# Patient Record
Sex: MALE | Race: WHITE | URBAN - NONMETROPOLITAN AREA
[De-identification: names, ages, dates, MRNs, and addresses within clinical notes are randomized per-mention and may not be internally consistent; named-entity substitution may affect disease eponyms.]

---

## 2017-10-25 ENCOUNTER — TRANSFERRED RECORDS (OUTPATIENT)
Dept: HEALTH INFORMATION MANAGEMENT | Facility: HOSPITAL | Age: 52
End: 2017-10-25

## 2017-10-26 ENCOUNTER — TRANSFERRED RECORDS (OUTPATIENT)
Dept: BEHAVIORAL HEALTH | Facility: HOSPITAL | Age: 52
End: 2017-10-26

## 2017-10-26 ENCOUNTER — HOSPITAL ENCOUNTER (INPATIENT)
Facility: HOSPITAL | Age: 52
LOS: 2 days | Discharge: HOME | DRG: 885 | End: 2017-10-28
Attending: PSYCHIATRY & NEUROLOGY | Admitting: PSYCHIATRY & NEUROLOGY
Payer: COMMERCIAL

## 2017-10-26 DIAGNOSIS — F33.1 MODERATE EPISODE OF RECURRENT MAJOR DEPRESSIVE DISORDER (H): Primary | ICD-10-CM

## 2017-10-26 PROBLEM — F32.A DEPRESSION WITH SUICIDAL IDEATION: Status: ACTIVE | Noted: 2017-10-26

## 2017-10-26 PROBLEM — R45.851 DEPRESSION WITH SUICIDAL IDEATION: Status: ACTIVE | Noted: 2017-10-26

## 2017-10-26 LAB — GLUCOSE BLDC GLUCOMTR-MCNC: 102 MG/DL (ref 70–99)

## 2017-10-26 PROCEDURE — 12400000 ZZH R&B MH

## 2017-10-26 PROCEDURE — 99223 1ST HOSP IP/OBS HIGH 75: CPT | Performed by: NURSE PRACTITIONER

## 2017-10-26 PROCEDURE — 00000146 ZZHCL STATISTIC GLUCOSE BY METER IP

## 2017-10-26 PROCEDURE — 25000132 ZZH RX MED GY IP 250 OP 250 PS 637: Performed by: NURSE PRACTITIONER

## 2017-10-26 RX ORDER — TRAZODONE HYDROCHLORIDE 50 MG/1
50 TABLET, FILM COATED ORAL
Status: DISCONTINUED | OUTPATIENT
Start: 2017-10-26 | End: 2017-10-28 | Stop reason: HOSPADM

## 2017-10-26 RX ORDER — VENLAFAXINE HYDROCHLORIDE 150 MG/1
150 CAPSULE, EXTENDED RELEASE ORAL 2 TIMES DAILY
Status: ON HOLD | COMMUNITY
End: 2017-10-28

## 2017-10-26 RX ORDER — CLOTRIMAZOLE 1 %
1 CREAM (GRAM) TOPICAL 2 TIMES DAILY PRN
Status: DISCONTINUED | OUTPATIENT
Start: 2017-10-26 | End: 2017-10-28 | Stop reason: HOSPADM

## 2017-10-26 RX ORDER — CLONAZEPAM 0.5 MG/1
0.5 TABLET ORAL 2 TIMES DAILY PRN
Status: DISCONTINUED | OUTPATIENT
Start: 2017-10-26 | End: 2017-10-28 | Stop reason: HOSPADM

## 2017-10-26 RX ORDER — OLANZAPINE 10 MG/1
10 TABLET ORAL 3 TIMES DAILY PRN
Status: DISCONTINUED | OUTPATIENT
Start: 2017-10-26 | End: 2017-10-28 | Stop reason: HOSPADM

## 2017-10-26 RX ORDER — TRAZODONE HYDROCHLORIDE 100 MG/1
100 TABLET ORAL AT BEDTIME
Status: DISCONTINUED | OUTPATIENT
Start: 2017-10-26 | End: 2017-10-28 | Stop reason: HOSPADM

## 2017-10-26 RX ORDER — CETIRIZINE HYDROCHLORIDE 10 MG/1
10 TABLET ORAL DAILY
Status: ON HOLD | COMMUNITY
End: 2018-01-20

## 2017-10-26 RX ORDER — SULFAMETHOXAZOLE/TRIMETHOPRIM 800-160 MG
1 TABLET ORAL 2 TIMES DAILY
Status: DISCONTINUED | OUTPATIENT
Start: 2017-10-26 | End: 2017-10-28 | Stop reason: HOSPADM

## 2017-10-26 RX ORDER — ALUMINA, MAGNESIA, AND SIMETHICONE 2400; 2400; 240 MG/30ML; MG/30ML; MG/30ML
30 SUSPENSION ORAL EVERY 4 HOURS PRN
Status: DISCONTINUED | OUTPATIENT
Start: 2017-10-26 | End: 2017-10-28 | Stop reason: HOSPADM

## 2017-10-26 RX ORDER — HYDROCODONE BITARTRATE AND ACETAMINOPHEN 10; 325 MG/1; MG/1
1 TABLET ORAL DAILY PRN
Status: ON HOLD | COMMUNITY
End: 2017-10-28

## 2017-10-26 RX ORDER — ACETAMINOPHEN 325 MG/1
650 TABLET ORAL EVERY 4 HOURS PRN
Status: DISCONTINUED | OUTPATIENT
Start: 2017-10-26 | End: 2017-10-28 | Stop reason: HOSPADM

## 2017-10-26 RX ORDER — CLOTRIMAZOLE 1 %
30 CREAM (GRAM) TOPICAL 2 TIMES DAILY PRN
Status: ON HOLD | COMMUNITY
End: 2018-01-20

## 2017-10-26 RX ORDER — NICOTINE POLACRILEX 4 MG
15-30 LOZENGE BUCCAL
Status: DISCONTINUED | OUTPATIENT
Start: 2017-10-26 | End: 2017-10-28 | Stop reason: HOSPADM

## 2017-10-26 RX ORDER — DULOXETIN HYDROCHLORIDE 60 MG/1
60 CAPSULE, DELAYED RELEASE ORAL DAILY
Status: DISCONTINUED | OUTPATIENT
Start: 2017-10-26 | End: 2017-10-28 | Stop reason: HOSPADM

## 2017-10-26 RX ORDER — CETIRIZINE HYDROCHLORIDE 10 MG/1
10 TABLET ORAL DAILY
Status: DISCONTINUED | OUTPATIENT
Start: 2017-10-26 | End: 2017-10-28 | Stop reason: HOSPADM

## 2017-10-26 RX ORDER — DEXTROSE MONOHYDRATE 25 G/50ML
25-50 INJECTION, SOLUTION INTRAVENOUS
Status: DISCONTINUED | OUTPATIENT
Start: 2017-10-26 | End: 2017-10-28 | Stop reason: HOSPADM

## 2017-10-26 RX ORDER — PROPRANOLOL HYDROCHLORIDE 10 MG/1
10 TABLET ORAL 3 TIMES DAILY
Status: DISCONTINUED | OUTPATIENT
Start: 2017-10-26 | End: 2017-10-28 | Stop reason: HOSPADM

## 2017-10-26 RX ORDER — HYDROXYZINE HYDROCHLORIDE 25 MG/1
25-50 TABLET, FILM COATED ORAL EVERY 4 HOURS PRN
Status: DISCONTINUED | OUTPATIENT
Start: 2017-10-26 | End: 2017-10-26

## 2017-10-26 RX ORDER — HYDROXYZINE HYDROCHLORIDE 25 MG/1
100 TABLET, FILM COATED ORAL 3 TIMES DAILY PRN
Status: DISCONTINUED | OUTPATIENT
Start: 2017-10-26 | End: 2017-10-28 | Stop reason: HOSPADM

## 2017-10-26 RX ORDER — BENZTROPINE MESYLATE 2 MG/1
2 TABLET ORAL AT BEDTIME
Status: DISCONTINUED | OUTPATIENT
Start: 2017-10-26 | End: 2017-10-28 | Stop reason: HOSPADM

## 2017-10-26 RX ORDER — EPINEPHRINE 0.3 MG/.3ML
0.3 INJECTION SUBCUTANEOUS PRN
Status: ON HOLD | COMMUNITY
End: 2018-01-20

## 2017-10-26 RX ORDER — DIVALPROEX SODIUM 250 MG/1
250 TABLET, DELAYED RELEASE ORAL 2 TIMES DAILY
Status: ON HOLD | COMMUNITY
End: 2018-01-20

## 2017-10-26 RX ORDER — SIMVASTATIN 10 MG
20 TABLET ORAL AT BEDTIME
Status: DISCONTINUED | OUTPATIENT
Start: 2017-10-26 | End: 2017-10-28 | Stop reason: HOSPADM

## 2017-10-26 RX ORDER — NITROGLYCERIN 0.4 MG/1
0.4 TABLET SUBLINGUAL EVERY 5 MIN PRN
Status: DISCONTINUED | OUTPATIENT
Start: 2017-10-26 | End: 2017-10-28 | Stop reason: HOSPADM

## 2017-10-26 RX ORDER — OLANZAPINE 10 MG/2ML
10 INJECTION, POWDER, FOR SOLUTION INTRAMUSCULAR 3 TIMES DAILY PRN
Status: DISCONTINUED | OUTPATIENT
Start: 2017-10-26 | End: 2017-10-28 | Stop reason: HOSPADM

## 2017-10-26 RX ORDER — ALBUTEROL SULFATE 90 UG/1
2 AEROSOL, METERED RESPIRATORY (INHALATION) 4 TIMES DAILY PRN
Status: DISCONTINUED | OUTPATIENT
Start: 2017-10-26 | End: 2017-10-28 | Stop reason: HOSPADM

## 2017-10-26 RX ORDER — DIVALPROEX SODIUM 250 MG/1
250 TABLET, DELAYED RELEASE ORAL 2 TIMES DAILY
Status: DISCONTINUED | OUTPATIENT
Start: 2017-10-26 | End: 2017-10-28 | Stop reason: HOSPADM

## 2017-10-26 RX ADMIN — PROPRANOLOL HYDROCHLORIDE 10 MG: 10 TABLET ORAL at 20:18

## 2017-10-26 RX ADMIN — BENZTROPINE MESYLATE 2 MG: 2 TABLET ORAL at 20:19

## 2017-10-26 RX ADMIN — SULFAMETHOXAZOLE AND TRIMETHOPRIM 1 TABLET: 800; 160 TABLET ORAL at 15:40

## 2017-10-26 RX ADMIN — SIMVASTATIN 20 MG: 10 TABLET, FILM COATED ORAL at 20:19

## 2017-10-26 RX ADMIN — SULFAMETHOXAZOLE AND TRIMETHOPRIM 1 TABLET: 800; 160 TABLET ORAL at 20:19

## 2017-10-26 RX ADMIN — CETIRIZINE HYDROCHLORIDE 10 MG: 10 TABLET, FILM COATED ORAL at 15:40

## 2017-10-26 RX ADMIN — OMEPRAZOLE 20 MG: 20 CAPSULE, DELAYED RELEASE ORAL at 15:40

## 2017-10-26 RX ADMIN — LEVOTHYROXINE SODIUM 137 MCG: 112 TABLET ORAL at 15:39

## 2017-10-26 RX ADMIN — TRAZODONE HYDROCHLORIDE 100 MG: 100 TABLET ORAL at 20:19

## 2017-10-26 RX ADMIN — HYDROXYZINE HYDROCHLORIDE 50 MG: 25 TABLET ORAL at 12:16

## 2017-10-26 RX ADMIN — DIVALPROEX SODIUM 250 MG: 250 TABLET, DELAYED RELEASE ORAL at 20:19

## 2017-10-26 RX ADMIN — DIVALPROEX SODIUM 250 MG: 250 TABLET, DELAYED RELEASE ORAL at 15:40

## 2017-10-26 RX ADMIN — DULOXETINE HYDROCHLORIDE 60 MG: 60 CAPSULE, DELAYED RELEASE ORAL at 15:39

## 2017-10-26 RX ADMIN — PROPRANOLOL HYDROCHLORIDE 10 MG: 10 TABLET ORAL at 15:39

## 2017-10-26 ASSESSMENT — ACTIVITIES OF DAILY LIVING (ADL)
TRANSFERRING: 0-->INDEPENDENT
TOILETING: 0-->INDEPENDENT
BATHING: 0-->INDEPENDENT
DRESS: 0-->INDEPENDENT
RETIRED_EATING: 0-->INDEPENDENT
GROOMING: INDEPENDENT
SWALLOWING: 0-->SWALLOWS FOODS/LIQUIDS WITHOUT DIFFICULTY
AMBULATION: 0-->INDEPENDENT
DRESS: SCRUBS (BEHAVIORAL HEALTH);INDEPENDENT
ORAL_HYGIENE: INDEPENDENT
COGNITION: 0 - NO COGNITION ISSUES REPORTED
RETIRED_COMMUNICATION: 0-->UNDERSTANDS/COMMUNICATES WITHOUT DIFFICULTY
FALL_HISTORY_WITHIN_LAST_SIX_MONTHS: NO

## 2017-10-26 NOTE — IP AVS SNAPSHOT
[facility]    [address] [place] [address]    Phone:  [phone]    Fax:  [phone]                                       After Visit Summary   10/26/2017    Desean Orlando    MRN: [id number]           After Visit Summary Signature Page     I have received my discharge instructions, and my questions have been answered. I have discussed any challenges I see with this plan with the nurse or doctor.    ..........................................................................................................................................  Patient/Patient Representative Signature      ..........................................................................................................................................  Patient Representative Print Name and Relationship to Patient    ..................................................               ................................................  Date                                            Time    ..........................................................................................................................................  Reviewed by Signature/Title    ...................................................              ..............................................  Date                                                            Time

## 2017-10-26 NOTE — IP AVS SNAPSHOT
MRN:[id number]                      After Visit Summary   10/26/2017    Desean Orlando    MRN: [id number]           Thank you!     Thank you for choosing [facility] for your care. Our goal is always to provide you with excellent care. Hearing back from our patients is one way we can continue to improve our services. Please take a few minutes to complete the written survey that you may receive in the mail after you visit with us. Thank you!        Patient Information     Date Of Birth          1965        Designated Caregiver       Most Recent Value    Caregiver    Will someone help with your care after discharge? no      About your hospital stay     You were admitted on:  October 26, 2017 You last received care in the:  [facility]    You were discharged on:  October 28, 2017       Who to Call     For medical emergencies, please call 911.  For non-urgent questions about your medical care, please call your primary care provider or clinic, [phone]          Attending Provider     Provider Specialty    Vlad Ruiz MD Psychiatry    Tatyana Husain NP Nurse Practitioner       Primary Care Provider Office Phone # Fax #    [place] Radha [phone]      Further instructions from your care team       Behavioral Discharge Planning and Instructions    Summary: Desean was admitted to  for increased depression.     Main Diagnosis:     Major Treatments, Procedures and Findings: Stabilize with medications, connect with community programs.    Symptoms to Report: feeling more aggressive, increased confusion, losing more sleep, mood getting worse or thoughts of suicide    Lifestyle Adjustment: Take all medications as prescribed, meet with doctor/ medication provider, out patient therapist, , and [facility] worker as scheduled. Abstain from alcohol or any unprescribed drugs.    Psychiatry Follow-up:     [facility]   PCP/Med. Management- Dr. Garcia 11/2  @10:20  [address] [place] [address]  Phone: [phone]  Fax: [phone]    [facility]   Therapy- Flash Haley 11/7 @[facility] [place] [address]  Phone:[phone]  Fax: [phone]      Resources:   Crisis Intervention: 229.619.4946 or 069-272-2142 (TTY: 692.895.6922).  Call anytime for help.  National [place] on Mental Illness (www.[place].ko.org): 990.803.7996 or 243-019-6194.  Alcoholics Anonymous (www.alcoholics-anonymous.org): Check your phone book for your local chapter.  Suicide Awareness Voices of Education (SAVE) (www.save.org): 506-177-IKPJ (5526)  National Suicide Prevention Line (www.mentalhealthmn.org): 901-482-WFTO (0951)  Mental Health Consumer/Survivor Network of [place] (www.mhcsn.net): 239.244.3718 or 238-384-4696  Mental Health Association of [place] (www.mentalhealth.org): 780.861.1270 or 492-859-6053    General Medication Instructions:   See your medication sheet(s) for instructions.   Take all medicines as directed.  Make no changes unless your doctor suggests them.   Go to all your doctor visits.  Be sure to have all your required lab tests. This way, your medicines can be refilled on time.  Do not use any drugs not prescribed by your doctor.  Avoid alcohol.    [place] Area:  [facility], Crisis stabilization [facility]- [phone]  [facility] Crisis Line: 6-912-408-7420  [facility] Family [facility]: 313-8963  Sexual Assault Program [facility]: 502.797.8926 or 1-518.545.6020  [place] [facility]: 8-061-832-6324 Ext: 279       Calls answered Mon-Fri-8:00 am--4:30 pm    [place]:  [facility]: 9-766-970-3703  [facility] first call for help: 7-137-157-5851  [facility]:  ([phone] 441-5606      [place] Area:  Warm Line: 1-416.340.8096       Calls answered Tuesday--Saturday 4:00 pm--10:00 pm  [facility] Crisis [facility] - [phone]  [facility] 856-839-6758    [place] Statewide:  [place] Crisis and  "Referral Services: 1-664-779-7715  National Suicide Prevention Lifeline: 0-626-256-TALK (8280)   - pjo6fmgs- Text  Life  to [address]  First Call for Help:   [facility] Helpline- 3-667-VZZM-HELP    Pending Results     Date and Time Order Name Status Description    10/28/2017 0001 Folate In process     10/28/2017 0001 Vitamin B12 In process     10/28/2017 0001 Vitamin D In process             Statement of Approval     Ordered          10/28/17 1214  I have reviewed and agree with all the recommendations and orders detailed in this document.  EFFECTIVE NOW     Approved and electronically signed by:  Tatyana Husain NP             Admission Information     Date & Time Provider Department Dept. Phone    10/26/2017 Tatyana Husain NP [facility] [phone]      Your Vitals Were     Blood Pressure Pulse Temperature Respirations Height Weight    118/74 65 97.4  F (36.3  C) (Tympanic) 14 1.88 m (6' 2\") 108.4 kg (239 lb)    Pulse Oximetry BMI (Body Mass Index)                98% 30.69 kg/m2          [facility] Information     [employer]olloze lets you send messages to your doctor, view your test results, renew your prescriptions, schedule appointments and more. To sign up, go to [email or web address] . Click on \"Log in\" on the left side of the screen, which will take you to the Welcome page. Then click on \"Sign up Now\" on the right side of the page.     You will be asked to enter the access code listed below, as well as some personal information. Please follow the directions to create your username and password.     Your access code is: [email or web address]  Expires: 2018 12:32 PM     Your access code will  in 90 days. If you need help or a new code, please call your [place] clinic or [phone].        Care EveryWhere ID     This is your Care EveryWhere ID. This could be used by other organizations to access your [place] medical records  [id number]        Equal Access to Services     YOLANDA RIVERS AH: Hadii salvatore collins " cordell Gonzales, waaxda luqadaha, qaybta kaalmada adejonathon, clementine remiin hayaan gitamerlyn jeremypaul laChristiankarissa dontrell. So [facility] [phone].    ATENCIÓN: Si habla afua, tiene a rosario disposición servicios gratuitos de asistencia lingüística. Tim al [phone].    We comply with applicable federal civil rights laws and [place] laws. We do not discriminate on the basis of race, color, national origin, age, disability, sex, sexual orientation, or gender identity.               Review of your medicines      START taking        Dose / Directions    sulfamethoxazole-trimethoprim 800-160 MG per tablet   Commonly known as:  BACTRIM DS/SEPTRA DS   Indication:  Urinary Tract Infection   Used for:  Moderate episode of recurrent major depressive disorder (H)        Dose:  1 tablet   Take 1 tablet by mouth 2 times daily Through 10/30/17   Quantity:  5 tablet   Refills:  0         CONTINUE these medicines which have NOT CHANGED        Dose / Directions    ACETAMINOPHEN PO        Dose:  500 mg   Take 500 mg by mouth 4 times daily as needed for pain   Refills:  0       ASPIRIN PO        Dose:  81 mg   Take 81 mg by mouth daily   Refills:  0       BENZTROPINE MESYLATE PO        Dose:  2 mg   Take 2 mg by mouth At Bedtime   Refills:  0       cetirizine 10 MG tablet   Commonly known as:  zyrTEC        Dose:  10 mg   Take 10 mg by mouth daily   Refills:  0       CLONAZEPAM PO        Dose:  0.5 mg   Take 0.5 mg by mouth 2 times daily as needed for anxiety   Refills:  0       clotrimazole 1 % cream   Commonly known as:  LOTRIMIN        Dose:  30 g   Apply 30 g topically 2 times daily as needed   Refills:  0       divalproex 250 MG EC tablet   Commonly known as:  DEPAKOTE        Dose:  250 mg   Take 250 mg by mouth 2 times daily   Refills:  0       DULOXETINE HCL PO        Dose:  60 mg   Take 60 mg by mouth daily   Refills:  0       EPINEPHrine 0.3 MG/0.3ML injection 2-pack   Commonly known as:  EPIPEN/ADRENACLICK/or ANY BX GENERIC EQUIV         Dose:  0.3 mg   Inject 0.3 mg into the muscle as needed for anaphylaxis   Refills:  0       glucose 4 G Chew chewable tablet        Dose:  1 tablet   Take 1 tablet by mouth 4 times daily as needed for low blood sugar   Refills:  0       HYDROXYZINE HCL PO        Dose:  100 mg   Take 100 mg by mouth 3 times daily as needed for itching (anxiety)   Refills:  0       LEVEMIR SC        Dose:  40 Units   Inject 40 Units Subcutaneous 2 times daily   Refills:  0       LEVOTHYROXINE SODIUM PO        Dose:  137 mcg   Take 137 mcg by mouth daily   Refills:  0       NITROGLYCERIN SL        Dose:  0.4 mg   Place 0.4 mg under the tongue   Refills:  0       OMEPRAZOLE PO        Dose:  20 mg   Take 20 mg by mouth daily   Refills:  0       PROPRANOLOL HCL PO        Dose:  10 mg   Take 10 mg by mouth 3 times daily   Refills:  0       SIMVASTATIN PO        Dose:  20 mg   Take 20 mg by mouth At Bedtime   Refills:  0       TRAZODONE HCL PO        Dose:  100 mg   Take 100 mg by mouth At Bedtime   Refills:  0       VENTOLIN HFA IN        Dose:  2 puff   Inhale 2 puffs into the lungs 4 times daily as needed   Refills:  0         STOP taking     FLUOXETINE HCL PO           HYDROcodone-acetaminophen  MG per tablet   Commonly known as:  NORCO           venlafaxine 150 MG 24 hr capsule   Commonly known as:  EFFEXOR-XR                Where to get your medicines      These medications were sent to [facility] [address] [place] [address] - [address] [facility] [address] [facility] [address] [facility] [address]    Hours:  24-hours Phone:  [phone]     sulfamethoxazole-trimethoprim 800-160 MG per tablet               ANTIBIOTIC INSTRUCTION     You've Been Prescribed an Antibiotic - Now What?  Your healthcare team thinks that you or your loved one might have an infection. Some infections can be treated with antibiotics, which are powerful, life-saving drugs. Like all medications, antibiotics have side effects and should only  be used when necessary. There are some important things you should know about your antibiotic treatment.      Your healthcare team may run tests before you start taking an antibiotic.    Your team may take samples (e.g., from your blood, urine or other areas) to run tests to look for bacteria. These test can be important to determine if you need an antibiotic at all and, if you do, which antibiotic will work best.      Within a few days, your healthcare team might change or even stop your antibiotic.    Your team may start you on an antibiotic while they are working to find out what is making you sick.    Your team might change your antibiotic because test results show that a different antibiotic would be better to treat your infection.    In some cases, once your team has more information, they learn that you do not need an antibiotic at all. They may find out that you don't have an infection, or that the antibiotic you're taking won't work against your infection. For example, an infection caused by a virus can't be treated with antibiotics. Staying on an antibiotic when you don't need it is more likely to be harmful than helpful.      You may experience side effects from your antibiotic.    Like all medications, antibiotics have side effects. Some of these can be serious.    Let you healthcare team know if you have any known allergies when you are admitted to the hospital.    One significant side effect of nearly all antibiotics is the risk of severe and sometimes deadly diarrhea caused by Clostridium difficile (C. Difficile). This occurs when a person takes antibiotics because some good germs are destroyed. Antibiotic use allows C. diificile to take over, putting patients at high risk for this serious infection.    As a patient or caregiver, it is important to understand your or your loved one's antibiotic treatment. It is especially important for caregivers to speak up when patients can't speak for themselves.  Here are some important questions to ask your healthcare team.    What infection is this antibiotic treating and how do you know I have that infection?    What side effects might occur from this antibiotic?    How long will I need to take this antibiotic?    Is it safe to take this antibiotic with other medications or supplements (e.g., vitamins) that I am taking?     Are there any special directions I need to know about taking this antibiotic? For example, should I take it with food?    How will I be monitored to know whether my infection is responding to the antibiotic?    What tests may help to make sure the right antibiotic is prescribed for me?      Information provided by:  www.cdc.gov/getsmart  U.S. Department of Health and Human Services  Centers for disease Control and Prevention  National Center for Emerging and Zoonotic Infectious Diseases  Division of Healthcare Quality Promotion         Protect others around you: Learn how to safely use, store and throw away your medicines at www.disposemymeds.org.             Medication List: This is a list of all your medications and when to take them. Check marks below indicate your daily home schedule. Keep this list as a reference.      Medications           Morning Afternoon Evening Bedtime As Needed    ACETAMINOPHEN PO   Take 500 mg by mouth 4 times daily as needed for pain                                ASPIRIN PO   Take 81 mg by mouth daily                                BENZTROPINE MESYLATE PO   Take 2 mg by mouth At Bedtime   Last time this was given:  2 mg on 10/27/2017  8:36 PM                                cetirizine 10 MG tablet   Commonly known as:  zyrTEC   Take 10 mg by mouth daily   Last time this was given:  10 mg on 10/28/2017  9:05 AM                                CLONAZEPAM PO   Take 0.5 mg by mouth 2 times daily as needed for anxiety                                clotrimazole 1 % cream   Commonly known as:  LOTRIMIN   Apply 30 g topically 2  times daily as needed                                divalproex 250 MG EC tablet   Commonly known as:  DEPAKOTE   Take 250 mg by mouth 2 times daily   Last time this was given:  250 mg on 10/28/2017  9:06 AM                                DULOXETINE HCL PO   Take 60 mg by mouth daily   Last time this was given:  60 mg on 10/28/2017  9:06 AM                                EPINEPHrine 0.3 MG/0.3ML injection 2-pack   Commonly known as:  EPIPEN/ADRENACLICK/or ANY BX GENERIC EQUIV   Inject 0.3 mg into the muscle as needed for anaphylaxis                                glucose 4 G Chew chewable tablet   Take 1 tablet by mouth 4 times daily as needed for low blood sugar                                HYDROXYZINE HCL PO   Take 100 mg by mouth 3 times daily as needed for itching (anxiety)   Last time this was given:  50 mg on 10/26/2017 12:16 PM                                LEVEMIR SC   Inject 40 Units Subcutaneous 2 times daily                                LEVOTHYROXINE SODIUM PO   Take 137 mcg by mouth daily   Last time this was given:  137 mcg on 10/28/2017  6:41 AM                                NITROGLYCERIN SL   Place 0.4 mg under the tongue                                OMEPRAZOLE PO   Take 20 mg by mouth daily   Last time this was given:  20 mg on 10/28/2017  6:41 AM                                PROPRANOLOL HCL PO   Take 10 mg by mouth 3 times daily   Last time this was given:  10 mg on 10/28/2017  9:06 AM                                SIMVASTATIN PO   Take 20 mg by mouth At Bedtime   Last time this was given:  20 mg on 10/27/2017  8:36 PM                                sulfamethoxazole-trimethoprim 800-160 MG per tablet   Commonly known as:  BACTRIM DS/SEPTRA DS   Take 1 tablet by mouth 2 times daily Through 10/30/17   Last time this was given:  1 tablet on 10/28/2017  9:06 AM                                TRAZODONE HCL PO   Take 100 mg by mouth At Bedtime   Last time this was given:  100 mg on 10/27/2017   8:37 PM                                VENTOLIN HFA IN   Inhale 2 puffs into the lungs 4 times daily as needed
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
## 2017-10-26 NOTE — PROGRESS NOTES
10/26/17 1138   Patient Belongings   Did you bring any home meds/supplements to the hospital?  Yes   Disposition of meds  Sent to security/pharmacy per site process   Patient Belongings clothing;glasses;keys;shoes;wallet   Disposition of Belongings Kept with patient;Other (see comment)  (eye glasses and plaid boxers kept with patient all other belongings sent to cubby in patient belongings room)   Belongings Search Yes   Clothing Search Yes   Second Staff Loki CROOKS   General Info Comment Blue jacket with [employer] emblem, 1 pair grey footies, 1 pair wrangler jeans, 1 green palaid shirt, brown leather belt, camo cap, white socks, palid boxers, blue tennis shoes size 13, 1 cath tube, cigs, lighter, matches, 6 packs lubricating jelly plus 1 partial tube, green pen , 16 cents    List items sent to safe: black wallet, [place], 2 health partner ins. cards, 1 set of keys    All other belongings put in assigned cubby in belongings room.     I have reviewed my belongings list on admission and verify that it is correct.     Patient signature_______________________________    Second staff witness (if patient unable to sign) ______________________________       I have received all my belongings at discharge.    Patient signature________________________________    Юлия   10/26/2017  11:48 AM

## 2017-10-26 NOTE — PLAN OF CARE
Face to face end of shift report received from Liat TSE RN. Rounding completed. Patient observed in his room.     Yue Womack  10/26/2017  3:48 PM

## 2017-10-27 LAB
GLUCOSE BLDC GLUCOMTR-MCNC: 115 MG/DL (ref 70–99)
GLUCOSE BLDC GLUCOMTR-MCNC: 173 MG/DL (ref 70–99)

## 2017-10-27 PROCEDURE — 99232 SBSQ HOSP IP/OBS MODERATE 35: CPT | Performed by: NURSE PRACTITIONER

## 2017-10-27 PROCEDURE — 12400000 ZZH R&B MH

## 2017-10-27 PROCEDURE — 25000131 ZZH RX MED GY IP 250 OP 636 PS 637: Performed by: NURSE PRACTITIONER

## 2017-10-27 PROCEDURE — 00000146 ZZHCL STATISTIC GLUCOSE BY METER IP

## 2017-10-27 PROCEDURE — 25000132 ZZH RX MED GY IP 250 OP 250 PS 637: Performed by: NURSE PRACTITIONER

## 2017-10-27 RX ADMIN — PROPRANOLOL HYDROCHLORIDE 10 MG: 10 TABLET ORAL at 13:42

## 2017-10-27 RX ADMIN — INSULIN DETEMIR 40 UNITS: 100 INJECTION, SOLUTION SUBCUTANEOUS at 08:56

## 2017-10-27 RX ADMIN — SIMVASTATIN 20 MG: 10 TABLET, FILM COATED ORAL at 20:36

## 2017-10-27 RX ADMIN — SULFAMETHOXAZOLE AND TRIMETHOPRIM 1 TABLET: 800; 160 TABLET ORAL at 20:36

## 2017-10-27 RX ADMIN — DIVALPROEX SODIUM 250 MG: 250 TABLET, DELAYED RELEASE ORAL at 08:55

## 2017-10-27 RX ADMIN — PROPRANOLOL HYDROCHLORIDE 10 MG: 10 TABLET ORAL at 20:36

## 2017-10-27 RX ADMIN — SULFAMETHOXAZOLE AND TRIMETHOPRIM 1 TABLET: 800; 160 TABLET ORAL at 08:55

## 2017-10-27 RX ADMIN — DULOXETINE HYDROCHLORIDE 60 MG: 60 CAPSULE, DELAYED RELEASE ORAL at 08:55

## 2017-10-27 RX ADMIN — INSULIN DETEMIR 40 UNITS: 100 INJECTION, SOLUTION SUBCUTANEOUS at 20:49

## 2017-10-27 RX ADMIN — BENZTROPINE MESYLATE 2 MG: 2 TABLET ORAL at 20:36

## 2017-10-27 RX ADMIN — DIVALPROEX SODIUM 250 MG: 250 TABLET, DELAYED RELEASE ORAL at 20:36

## 2017-10-27 RX ADMIN — TRAZODONE HYDROCHLORIDE 100 MG: 100 TABLET ORAL at 20:37

## 2017-10-27 RX ADMIN — LEVOTHYROXINE SODIUM 137 MCG: 112 TABLET ORAL at 07:05

## 2017-10-27 RX ADMIN — PROPRANOLOL HYDROCHLORIDE 10 MG: 10 TABLET ORAL at 08:55

## 2017-10-27 RX ADMIN — CETIRIZINE HYDROCHLORIDE 10 MG: 10 TABLET, FILM COATED ORAL at 08:55

## 2017-10-27 RX ADMIN — OMEPRAZOLE 20 MG: 20 CAPSULE, DELAYED RELEASE ORAL at 07:05

## 2017-10-27 ASSESSMENT — ACTIVITIES OF DAILY LIVING (ADL): GROOMING: INDEPENDENT
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
## 2017-10-27 NOTE — PLAN OF CARE
Problem: Patient Care Overview  Goal: Team Discussion  Team Plan:   BEHAVIORAL TEAM DISCUSSION     Participants: Caroline Valdez NP, Rocio Short NP, Katheryn Xavier [facility], Karin Smith [facility] Center[facility], Yaz BarrettFauquier Health SystemSW,  Fanny Russell Discharge Plannner, Enedelia Loco RN, Stacie Lopez RN, Lacey Bran OT, Nadiya Song OT   Progress: Some. Not suicidal  Continued Stay Criteria/Rationale: Intermittent d/t impulsiveness  Medical/Physical: TBI  Precautions:   Behavioral Orders   Procedures     Code 1 - Restrict to Unit     Routine Programming       As clinically indicated     Status 15       Every 15 minutes.     Plan: OT eval for cognition  Rationale for change in precautions or plan: None
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
## 2017-10-27 NOTE — DISCHARGE INSTRUCTIONS
Behavioral Discharge Planning and Instructions    Summary: Desean was admitted to  for increased depression.     Main Diagnosis:     Major Treatments, Procedures and Findings: Stabilize with medications, connect with community programs.    Symptoms to Report: feeling more aggressive, increased confusion, losing more sleep, mood getting worse or thoughts of suicide    Lifestyle Adjustment: Take all medications as prescribed, meet with doctor/ medication provider, out patient therapist, , and [facility] worker as scheduled. Abstain from alcohol or any unprescribed drugs.    Psychiatry Follow-up:     [facility]   PCP/Med. Management- Dr. Garcia 11/2 @10:20  [address] [place] [address]  Phone: [phone]  Fax: [phone]    [facility]   Therapy- Flash Haley 11/7 @[address] [place] [address]  Phone:[phone]  Fax: [phone]      Resources:   Crisis Intervention: 418.373.4967 or 604-617-4896 (TTY: 896.623.9368).  Call anytime for help.  National [place] on Mental Illness (www.[place].ko.org): 811.476.8738 or 895-297-4975.  Alcoholics Anonymous (www.alcoholics-anonymous.org): Check your phone book for your local chapter.  Suicide Awareness Voices of Education (SAVE) (www.save.org): 357-310-HAFS (1883)  National Suicide Prevention Line (www.mentalhealthmn.org): 623-984-JIXN (3949)  Mental Health Consumer/Survivor Network of [place] (www.mhcsn.net): 726.596.5374 or 668-519-2328  Mental Health Association of [place] (www.mentalhealth.org): 315.493.2818 or 248-324-0584    General Medication Instructions:   See your medication sheet(s) for instructions.   Take all medicines as directed.  Make no changes unless your doctor suggests them.   Go to all your doctor visits.  Be sure to have all your required lab tests. This way, your medicines can be refilled on time.  Do not use any drugs not prescribed by your doctor.  Avoid alcohol.    [place] Area:  [facility]  stabilization housing- [phone]  [facility] Crisis Line: 2-114-994-8044  Advocates For Family Peace: 715-5456  Sexual Assault Program [facility]: 133.380.1090 or 1-571.105.4706  [facility] [place] Battered Women's Program: 8-010-216-8553 Ext: 279       Calls answered Mon-Fri-8:00 am--4:30 pm    [place]:  Advocates for Family Peace: 6-205-072-2372  [facility] first call for help: 7-978-567-8835  [facility]:  (553) 133-8168      [place] Area:  Warm Line: 1-842.977.1426       Calls answered Tuesday--Saturday 4:00 pm--10:00 pm  [facility] Crisis Line - 407.241.1147  [facility] Stabilization 121-875-9175    [place] Statewide:  [place] Crisis and Referral Services: 6-635-224-0785  National Suicide Prevention Lifeline: 0-462-713-TALK (2331)   - muv7nwuu- Text  Life  to 23840  First Call for Help: 2-1-1  MICHELL Helpline- 4-131-VMMD-HELP
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
## 2017-10-27 NOTE — PLAN OF CARE
Face to face end of shift report received from Ruchi RENTERIA. Rounding completed. Patient observed in [facility].    Stacie Lopez  10/27/2017  7:38 AM

## 2017-10-27 NOTE — PLAN OF CARE
Face to face end of shift report received from Stacie CHOU RN. Rounding completed. Patient observed.     Yue Laurent  10/27/2017  4:02 PM
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
## 2017-10-27 NOTE — PLAN OF CARE
Problem: Patient Care Overview  Goal: Individualization & Mutuality  Pt will attend at least 50% of group.  Pt will be compliant with prescribed medication.  Pt will agree with treatment team recommendations.   Pt will sleep 6 - 8 hours a night   Outcome: No Change  Slept without issue - approx  5 to 6 hours - has used the catheter twice in 30 min. Does say it is still uncomfortable to use.  No blood or residue noted on catheter when staff disposes of.

## 2017-10-27 NOTE — PLAN OF CARE
"Face to face end of shift report received from matias Turner. Rounding completed. Patient observed. Lying on right side - eyes closed - non-labored breathing noted. Left a sticky note in CNP area to address - pt apparently has a \"yeast\" infection in his groin area.    Ruchi Murillo  10/27/2017  1:51 AM          "

## 2017-10-27 NOTE — PROGRESS NOTES
[facility]  Psychiatric Progress Note      Impression:   This is a 52 year old male with a history of TBI and depression. Today Desean denies nay suicidal ideation and is in a bright mood. He feels his medications are working well and he is no longer suicidal. Desean tells me that his [employer] worker is helping him get another apartment or place to live. Desean found out he qualifies for section 8 so he is happy about that. Desean is hoping to be out of [facility] by November 1st. He does tell me that he has a poor memory and cognitive issues due to his TBI. OT will do an evaluation to establish a baseline and help initiate supportive services as applicable.    Educated regarding medication indications, risks, benefits, side effects, contraindications and possible interactions. Verbally expressed understanding.        DIagnoses:     MDD-moderate-without psychotic features  TBI    Attestation:  Patient has been seen and evaluated by me,  Rocio Short, DARYL          Interim History:   The patient's care was discussed with the treatment team and chart notes were reviewed.          Medications:     Current Facility-Administered Medications Ordered in Epic   Medication Dose Route Frequency Last Rate Last Dose     acetaminophen (TYLENOL) tablet 650 mg  650 mg Oral Q4H PRN         alum & mag hydroxide-simethicone (MYLANTA ES/MAALOX  ES) suspension 30 mL  30 mL Oral Q4H PRN         magnesium hydroxide (MILK OF MAGNESIA) suspension 30 mL  30 mL Oral At Bedtime PRN         traZODone (DESYREL) tablet 50 mg  50 mg Oral At Bedtime PRN         OLANZapine (zyPREXA) tablet 10 mg  10 mg Oral TID PRN        Or     OLANZapine (zyPREXA) injection 10 mg  10 mg Intramuscular TID PRN         nicotine polacrilex (NICORETTE) gum 2-4 mg  2-4 mg Buccal Q1H PRN         albuterol (PROAIR HFA/PROVENTIL HFA/VENTOLIN HFA) Inhaler 2 puff  2 puff Inhalation 4x Daily PRN         benztropine (COGENTIN) tablet 2 mg  2 mg Oral At Bedtime   2 mg  "at 10/26/17 2019     cetirizine (zyrTEC) tablet 10 mg  10 mg Oral Daily   10 mg at 10/27/17 0855     clotrimazole (LOTRIMIN) 1 % cream 1 g  1 applicator Topical BID PRN         divalproex (DEPAKOTE) EC tablet 250 mg  250 mg Oral BID   250 mg at 10/27/17 0855     hydrOXYzine (ATARAX) tablet 100 mg  100 mg Oral TID PRN         insulin detemir (LEVEMIR) injection 40 Units  40 Units Subcutaneous BID   40 Units at 10/27/17 0856     levothyroxine (SYNTHROID/LEVOTHROID) tablet 137 mcg  137 mcg Oral Daily   137 mcg at 10/27/17 0705     nitroGLYcerin (NITROSTAT) sublingual tablet 0.4 mg  0.4 mg Sublingual Q5 Min PRN         omeprazole (priLOSEC) CR capsule 20 mg  20 mg Oral Daily   20 mg at 10/27/17 0705     propranolol (INDERAL) tablet 10 mg  10 mg Oral TID   10 mg at 10/27/17 0855     simvastatin (ZOCOR) tablet 20 mg  20 mg Oral At Bedtime   20 mg at 10/26/17 2019     traZODone (DESYREL) tablet 100 mg  100 mg Oral At Bedtime   100 mg at 10/26/17 2019     DULoxetine (CYMBALTA) EC capsule 60 mg  60 mg Oral Daily   60 mg at 10/27/17 0855     clonazePAM (klonoPIN) tablet 0.5 mg  0.5 mg Oral BID PRN         sulfamethoxazole-trimethoprim (BACTRIM DS/SEPTRA DS) 800-160 MG per tablet 1 tablet  1 tablet Oral BID   1 tablet at 10/27/17 0855     glucose 40 % gel 15-30 g  15-30 g Oral Q15 Min PRN        Or     dextrose 50 % injection 25-50 mL  25-50 mL Intravenous Q15 Min PRN        Or     glucagon injection 1 mg  1 mg Subcutaneous Q15 Min PRN         No current Epic-ordered outpatient prescriptions on file.              10 point ROS negative        Allergies:     Allergies   Allergen Reactions     Bee Venom Anaphylaxis     Fish-Derived Products      Latex      Naproxen      Shellfish-Derived Products      Penicillins Rash            Psychiatric Examination:   /67  Pulse 65  Temp 96.6  F (35.9  C) (Tympanic)  Resp 14  Ht 1.88 m (6' 2\")  Wt 108.4 kg (239 lb)  SpO2 97%  BMI 30.69 kg/m2  Weight is 239 lbs 0 oz  Body mass " index is 30.69 kg/(m^2).    Appearance:  awake, alert, adequately groomed and dressed in hospital scrubs  Attitude:  cooperative  Eye Contact:  good  Mood:  better  Affect:  appropriate and in normal range and mood congruent  Speech:  clear, coherent  Psychomotor Behavior:  no evidence of tardive dyskinesia, dystonia, or tics  Thought Process:  logical  Associations:  no loose associations  Thought Content:  no evidence of suicidal ideation or homicidal ideation, no evidence of psychotic thought, no auditory hallucinations present and no visual hallucinations present  Insight:  good  Judgment:  fair  Oriented to:  time, person, and place  Attention Span and Concentration:  fair  Recent and Remote Memory:  limited  Fund of Knowledge: delayed  Muscle Strength and Tone: normal  Gait and Station: Normal           Labs:     Results for orders placed or performed during the hospital encounter of 10/26/17 (from the past 48 hour(s))   Glucose by meter   Result Value Ref Range    Glucose 102 (H) 70 - 99 mg/dL   Glucose by meter   Result Value Ref Range    Glucose 173 (H) 70 - 99 mg/dL                Plan:   Continue current medications  OT eval

## 2017-10-27 NOTE — PHARMACY
[facility]    Pharmacy      Antimicrobial Stewardship Note     Current antimicrobial therapy:  Anti-infectives (Future)    Start     Dose/Rate Route Frequency Ordered Stop    10/26/17 1515  sulfamethoxazole-trimethoprim (BACTRIM DS/SEPTRA DS) 800-160 MG per tablet 1 tablet      1 tablet Oral 2 TIMES DAILY 10/26/17 1455 10/31/17 0859          Indication: UTI     Pertinent labs:  Creatinine No results found for: CR  WBC No results found for: WBC  ANC No components found for: ANC     Culture Results: None performed.     Recommendations/Interventions:  1. No recommendations at this time; current antimicrobial pharmacotherapy is appropriate.    Uriel Pozo [facility]  October 27, 2017

## 2017-10-27 NOTE — PLAN OF CARE
Problem: Patient Care Overview  Goal: Plan of Care/Patient Progress Review  OT evaluation completed on this date. Patient denies any problems with his medication management;however, collaborative information from public health nurse notes otherwise. Patient was adverse to completing medication CPT but did complete ACL (Erick Cognitive Level) which provides insight into safety with his medication management.    ACL score was 5.0/5.8 which indicates that the person may live alone with weekly checks to monitor safety and check problem solving effectiveness. [place] in attending regularly scheduled community activities may be expected. 22% standby cognitive assistance is refquired to anticipate environmental hazards and prevent social conflict.   At this level reality orientation can include these characteristics: Knows to make appointments and to schedule infrequent events, but is apt to forget or miss. Conceives of self as entiltled to individual rights, wholly autonomous, or the passive recipient of external actions (i.e. a 'victim'). Frequently frustrated with perceived overwhelming variables of life. To help patient reality test, point out concrete examples of illogical, unacceptable or provocative behavior, being late for appointments and potentially damaging consequences in intended activities. Patient may be defensive and argumentative with therapist's or others' attempts to assist.   The following warnings are ways of preventing common safety problems: Medication: prevent poor compliance with taking medication by handing measured liquids and pills to the person or pouring into daily pill box marked for the day and time. Prevent running out of medications by checking on supply and renewing prescriptions. Finances: provide a weekly or monthly income and assist with long term finances. Meals: If a stove is used check to be sure that it is turned off, move pot holders, paper and anything else that could burn  away from heat, turn pot handles toward stove, and carry boiling water for patient.   Summary:  Patient notes that he currently has a Rep Payee, [employer], and assistance with [facility] which provides transportation which addresses some of the areas of concern identified from the assessment. Patient at a [facility] which provides meals.   At this time the patient could benefit from medication monitoring for compliance which can include but not limited to administration, setup of weekly meds, monitoring administration of meds, assisting with refills, use of a med-ready or similar.

## 2017-10-28 VITALS
BODY MASS INDEX: 30.67 KG/M2 | TEMPERATURE: 97.4 F | RESPIRATION RATE: 14 BRPM | WEIGHT: 239 LBS | DIASTOLIC BLOOD PRESSURE: 74 MMHG | OXYGEN SATURATION: 98 % | SYSTOLIC BLOOD PRESSURE: 118 MMHG | HEART RATE: 65 BPM | HEIGHT: 74 IN

## 2017-10-28 LAB
ALBUMIN SERPL-MCNC: 2.9 G/DL (ref 3.4–5)
ALP SERPL-CCNC: 87 U/L (ref 40–150)
ALT SERPL W P-5'-P-CCNC: 24 U/L (ref 0–70)
AST SERPL W P-5'-P-CCNC: 16 U/L (ref 0–45)
BILIRUB DIRECT SERPL-MCNC: <0.1 MG/DL (ref 0–0.2)
BILIRUB SERPL-MCNC: 0.1 MG/DL (ref 0.2–1.3)
GLUCOSE BLDC GLUCOMTR-MCNC: 125 MG/DL (ref 70–99)
PROT SERPL-MCNC: 6.8 G/DL (ref 6.8–8.8)

## 2017-10-28 PROCEDURE — 82607 VITAMIN B-12: CPT | Performed by: NURSE PRACTITIONER

## 2017-10-28 PROCEDURE — 25000132 ZZH RX MED GY IP 250 OP 250 PS 637: Performed by: NURSE PRACTITIONER

## 2017-10-28 PROCEDURE — 36415 COLL VENOUS BLD VENIPUNCTURE: CPT | Performed by: NURSE PRACTITIONER

## 2017-10-28 PROCEDURE — 82746 ASSAY OF FOLIC ACID SERUM: CPT | Performed by: NURSE PRACTITIONER

## 2017-10-28 PROCEDURE — 99239 HOSP IP/OBS DSCHRG MGMT >30: CPT | Performed by: NURSE PRACTITIONER

## 2017-10-28 PROCEDURE — 80076 HEPATIC FUNCTION PANEL: CPT | Performed by: NURSE PRACTITIONER

## 2017-10-28 PROCEDURE — 00000146 ZZHCL STATISTIC GLUCOSE BY METER IP

## 2017-10-28 PROCEDURE — 82306 VITAMIN D 25 HYDROXY: CPT | Performed by: NURSE PRACTITIONER

## 2017-10-28 RX ORDER — SULFAMETHOXAZOLE/TRIMETHOPRIM 800-160 MG
1 TABLET ORAL 2 TIMES DAILY
Qty: 5 TABLET | Refills: 0 | Status: ON HOLD | OUTPATIENT
Start: 2017-10-28 | End: 2018-01-20

## 2017-10-28 RX ADMIN — PROPRANOLOL HYDROCHLORIDE 10 MG: 10 TABLET ORAL at 13:55

## 2017-10-28 RX ADMIN — OMEPRAZOLE 20 MG: 20 CAPSULE, DELAYED RELEASE ORAL at 06:41

## 2017-10-28 RX ADMIN — DULOXETINE HYDROCHLORIDE 60 MG: 60 CAPSULE, DELAYED RELEASE ORAL at 09:06

## 2017-10-28 RX ADMIN — SULFAMETHOXAZOLE AND TRIMETHOPRIM 1 TABLET: 800; 160 TABLET ORAL at 09:06

## 2017-10-28 RX ADMIN — INSULIN DETEMIR 40 UNITS: 100 INJECTION, SOLUTION SUBCUTANEOUS at 09:07

## 2017-10-28 RX ADMIN — PROPRANOLOL HYDROCHLORIDE 10 MG: 10 TABLET ORAL at 09:06

## 2017-10-28 RX ADMIN — DIVALPROEX SODIUM 250 MG: 250 TABLET, DELAYED RELEASE ORAL at 09:06

## 2017-10-28 RX ADMIN — CETIRIZINE HYDROCHLORIDE 10 MG: 10 TABLET, FILM COATED ORAL at 09:05

## 2017-10-28 RX ADMIN — LEVOTHYROXINE SODIUM 137 MCG: 112 TABLET ORAL at 06:41

## 2017-10-28 NOTE — PLAN OF CARE
Discharge Note    Patient Discharged to home on 10/28/2017 3:11 PM via [employer] accompanied by staff .     Patient informed of discharge instructions in AVS. patient verbalizes understanding and denies having any questions pertaining to AVS. Patient stable at time of discharge. Patient denies SI, HI, and thoughts of self harm at time of discharge. All personal belongings returned to patient. Discharge prescriptions sent to [facility] via electronic communication.  Stacie Lopez  10/28/2017  3:11 PM

## 2017-10-28 NOTE — PLAN OF CARE
"Problem: Patient Care Overview  Goal: Individualization & Mutuality  Pt will attend at least 50% of group.  Pt will be compliant with prescribed medication.  Pt will agree with treatment team recommendations.   Pt will sleep 6 - 8 hours a night   Outcome: No Change  Patient had rested for a few hours earlier on this shift. Patient blood sugar was tested at 117 at 430 pm. Patient ate well for supper meal. He has not participated in groups today. Patient denies any physical problems or pain this evening. He states that he is fine with going home tomorrow to [facility] but also states that he has no intention of staying there very long. He tells me that he didn't feel he was treated respectfully by the staff there and was accused of selling his narco. He tells me that he has never done this and \"wouldn't even know how to.\" Patient requested a catheter this evening. He has had no falls. Denies depression and thoughts of suicide.       "

## 2017-10-28 NOTE — PLAN OF CARE
Face to face end of shift report received from Cass RENTERIA. Rounding completed. Patient observed in [facility]    Stacie Lopez  10/28/2017  7:43 AM

## 2017-10-28 NOTE — PLAN OF CARE
Problem: Patient Care Overview  Goal: Individualization & Mutuality  Pt will attend at least 50% of group.  Pt will be compliant with prescribed medication.  Pt will agree with treatment team recommendations.   Pt will sleep 6 - 8 hours a night   0200: Pt observed in bed and appears to be asleep. Eyes closed and non-labored respirations noted. Will continue to monitor.   0600: Pt observed out in lounge. He reported a good night sleep. 15 minute safety checks done throughout shift and position changes noted throughout night. BG this morning was 125. Pt is pleasant and bright this morning.

## 2017-10-28 NOTE — PLAN OF CARE
Face to face end of shift report received from Yue Laurent RN. Rounding completed. Patient observed in bed and appears to be asleep. Eyes closed and non-labored respirations noted.     Cass Charles  10/27/2017  11:48 PM
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
## 2017-10-28 NOTE — PHARMACY
[facility]    Pharmacy      Antimicrobial Stewardship Note     Current antimicrobial therapy:  Anti-infectives (Future)    Start     Dose/Rate Route Frequency Ordered Stop    10/26/17 1515  sulfamethoxazole-trimethoprim (BACTRIM DS/SEPTRA DS) 800-160 MG per tablet 1 tablet      1 tablet Oral 2 TIMES DAILY 10/26/17 1455 10/31/17 0859          Indication: UTI     Pertinent labs:  Creatinine No results found for: CR  WBC No results found for: WBC  ANC No components found for: ANC     Culture Results: see previous note   Recommendations/Interventions:  1. None at this itme    Laine Michele RPH  October 28, 2017

## 2017-10-28 NOTE — PLAN OF CARE
Problem: Patient Care Overview  Goal: Individualization & Mutuality  Pt will attend at least 50% of group.  Pt will be compliant with prescribed medication.  Pt will agree with treatment team recommendations.   Pt will sleep 6 - 8 hours a night   Patient in the lounge watching tv or playing cards all morning. Patient was pleasant and appropriate during conversation. Patient denies SI, HI, depression, anxiety. Patient did not attend some groups this shift. Patient was cooperative with all scheduled medications. Patient asking when he can go back to [facility]. Patient did request a cath this shift. Patient denies pain. Will continue to monitor.

## 2017-10-30 LAB
FOLATE SERPL-MCNC: 8 NG/ML
VIT B12 SERPL-MCNC: 493 PG/ML (ref 193–986)

## 2017-10-31 LAB — DEPRECATED CALCIDIOL+CALCIFEROL SERPL-MC: 17 UG/L (ref 20–75)

## 2018-01-19 ENCOUNTER — TRANSFERRED RECORDS (OUTPATIENT)
Dept: HEALTH INFORMATION MANAGEMENT | Facility: HOSPITAL | Age: 53
End: 2018-01-19

## 2018-01-20 ENCOUNTER — HOSPITAL ENCOUNTER (INPATIENT)
Facility: HOSPITAL | Age: 53
LOS: 3 days | Discharge: NURSING, REHAB OR LONG-TERM CARE | End: 2018-01-23
Attending: PSYCHIATRY & NEUROLOGY | Admitting: PSYCHIATRY & NEUROLOGY
Payer: MEDICAID

## 2018-01-20 DIAGNOSIS — F33.1 MODERATE EPISODE OF RECURRENT MAJOR DEPRESSIVE DISORDER (H): Primary | ICD-10-CM

## 2018-01-20 PROBLEM — R45.851 SUICIDAL IDEATION: Status: ACTIVE | Noted: 2018-01-20

## 2018-01-20 LAB
ALBUMIN UR-MCNC: 10 MG/DL
AMPHETAMINES UR QL SCN: NEGATIVE
APPEARANCE UR: ABNORMAL
BACTERIA #/AREA URNS HPF: ABNORMAL /HPF
BARBITURATES UR QL: NEGATIVE
BENZODIAZ UR QL: NEGATIVE
BILIRUB UR QL STRIP: NEGATIVE
CANNABINOIDS UR QL SCN: NEGATIVE
COCAINE UR QL: NEGATIVE
COLOR UR AUTO: YELLOW
GLUCOSE BLDC GLUCOMTR-MCNC: 218 MG/DL (ref 70–99)
GLUCOSE UR STRIP-MCNC: 150 MG/DL
HGB UR QL STRIP: NEGATIVE
KETONES UR STRIP-MCNC: 5 MG/DL
LEUKOCYTE ESTERASE UR QL STRIP: ABNORMAL
METHADONE UR QL SCN: NEGATIVE
MUCOUS THREADS #/AREA URNS LPF: PRESENT /LPF
NITRATE UR QL: POSITIVE
OPIATES UR QL SCN: NEGATIVE
PCP UR QL SCN: NEGATIVE
PH UR STRIP: 7 PH (ref 4.7–8)
RBC #/AREA URNS AUTO: 2 /HPF (ref 0–2)
SOURCE: ABNORMAL
SP GR UR STRIP: 1.01 (ref 1–1.03)
SQUAMOUS #/AREA URNS AUTO: <1 /HPF (ref 0–1)
UROBILINOGEN UR STRIP-MCNC: NORMAL MG/DL (ref 0–2)
WBC #/AREA URNS AUTO: 5 /HPF (ref 0–2)

## 2018-01-20 PROCEDURE — 12400000 ZZH R&B MH

## 2018-01-20 PROCEDURE — 25000132 ZZH RX MED GY IP 250 OP 250 PS 637: Performed by: NURSE PRACTITIONER

## 2018-01-20 PROCEDURE — 99223 1ST HOSP IP/OBS HIGH 75: CPT | Performed by: NURSE PRACTITIONER

## 2018-01-20 PROCEDURE — 00000146 ZZHCL STATISTIC GLUCOSE BY METER IP

## 2018-01-20 PROCEDURE — 80307 DRUG TEST PRSMV CHEM ANLYZR: CPT | Performed by: NURSE PRACTITIONER

## 2018-01-20 PROCEDURE — 81001 URINALYSIS AUTO W/SCOPE: CPT | Performed by: NURSE PRACTITIONER

## 2018-01-20 RX ORDER — LEVOTHYROXINE SODIUM 137 UG/1
137 TABLET ORAL DAILY
Status: DISCONTINUED | OUTPATIENT
Start: 2018-01-20 | End: 2018-01-23 | Stop reason: HOSPADM

## 2018-01-20 RX ORDER — CETIRIZINE HYDROCHLORIDE 10 MG/1
10 TABLET ORAL DAILY PRN
COMMUNITY

## 2018-01-20 RX ORDER — NITROGLYCERIN 0.4 MG/1
0.4 TABLET SUBLINGUAL EVERY 5 MIN PRN
Status: DISCONTINUED | OUTPATIENT
Start: 2018-01-20 | End: 2018-01-23 | Stop reason: HOSPADM

## 2018-01-20 RX ORDER — DIVALPROEX SODIUM 500 MG/1
500 TABLET, DELAYED RELEASE ORAL AT BEDTIME
Status: DISCONTINUED | OUTPATIENT
Start: 2018-01-20 | End: 2018-01-23 | Stop reason: HOSPADM

## 2018-01-20 RX ORDER — ALBUTEROL SULFATE 90 UG/1
2 AEROSOL, METERED RESPIRATORY (INHALATION) EVERY 6 HOURS PRN
COMMUNITY

## 2018-01-20 RX ORDER — TRAZODONE HYDROCHLORIDE 50 MG/1
50 TABLET, FILM COATED ORAL
Status: DISCONTINUED | OUTPATIENT
Start: 2018-01-20 | End: 2018-01-20

## 2018-01-20 RX ORDER — DIVALPROEX SODIUM 250 MG/1
250 TABLET, DELAYED RELEASE ORAL 2 TIMES DAILY
Status: ON HOLD | COMMUNITY
End: 2018-01-21

## 2018-01-20 RX ORDER — ALBUTEROL SULFATE 90 UG/1
2 AEROSOL, METERED RESPIRATORY (INHALATION) EVERY 6 HOURS PRN
Status: DISCONTINUED | OUTPATIENT
Start: 2018-01-20 | End: 2018-01-23 | Stop reason: HOSPADM

## 2018-01-20 RX ORDER — TRAZODONE HYDROCHLORIDE 100 MG/1
100 TABLET ORAL AT BEDTIME
Status: DISCONTINUED | OUTPATIENT
Start: 2018-01-20 | End: 2018-01-23 | Stop reason: HOSPADM

## 2018-01-20 RX ORDER — HYDROXYZINE HYDROCHLORIDE 25 MG/1
25-50 TABLET, FILM COATED ORAL EVERY 4 HOURS PRN
Status: DISCONTINUED | OUTPATIENT
Start: 2018-01-20 | End: 2018-01-20

## 2018-01-20 RX ORDER — ACETAMINOPHEN 325 MG/1
650 TABLET ORAL EVERY 4 HOURS PRN
Status: DISCONTINUED | OUTPATIENT
Start: 2018-01-20 | End: 2018-01-23 | Stop reason: HOSPADM

## 2018-01-20 RX ORDER — ASPIRIN 81 MG/1
81 TABLET, CHEWABLE ORAL DAILY
Status: DISCONTINUED | OUTPATIENT
Start: 2018-01-20 | End: 2018-01-23 | Stop reason: HOSPADM

## 2018-01-20 RX ORDER — SIMVASTATIN 20 MG
20 TABLET ORAL AT BEDTIME
Status: DISCONTINUED | OUTPATIENT
Start: 2018-01-20 | End: 2018-01-23 | Stop reason: HOSPADM

## 2018-01-20 RX ORDER — OLANZAPINE 10 MG/1
10 TABLET ORAL
Status: DISCONTINUED | OUTPATIENT
Start: 2018-01-20 | End: 2018-01-23 | Stop reason: HOSPADM

## 2018-01-20 RX ORDER — OLANZAPINE 10 MG/2ML
10 INJECTION, POWDER, FOR SOLUTION INTRAMUSCULAR
Status: DISCONTINUED | OUTPATIENT
Start: 2018-01-20 | End: 2018-01-23 | Stop reason: HOSPADM

## 2018-01-20 RX ORDER — HYDROXYZINE PAMOATE 50 MG/1
100 CAPSULE ORAL 3 TIMES DAILY
Status: DISCONTINUED | OUTPATIENT
Start: 2018-01-20 | End: 2018-01-23 | Stop reason: HOSPADM

## 2018-01-20 RX ORDER — DIVALPROEX SODIUM 250 MG/1
250 TABLET, DELAYED RELEASE ORAL EVERY MORNING
Status: DISCONTINUED | OUTPATIENT
Start: 2018-01-21 | End: 2018-01-23 | Stop reason: HOSPADM

## 2018-01-20 RX ORDER — ALUMINA, MAGNESIA, AND SIMETHICONE 2400; 2400; 240 MG/30ML; MG/30ML; MG/30ML
30 SUSPENSION ORAL EVERY 4 HOURS PRN
Status: DISCONTINUED | OUTPATIENT
Start: 2018-01-20 | End: 2018-01-23 | Stop reason: HOSPADM

## 2018-01-20 RX ORDER — PROPRANOLOL HYDROCHLORIDE 10 MG/1
10 TABLET ORAL 3 TIMES DAILY PRN
Status: DISCONTINUED | OUTPATIENT
Start: 2018-01-20 | End: 2018-01-23 | Stop reason: HOSPADM

## 2018-01-20 RX ORDER — CETIRIZINE HYDROCHLORIDE 10 MG/1
10 TABLET ORAL DAILY PRN
Status: DISCONTINUED | OUTPATIENT
Start: 2018-01-20 | End: 2018-01-23 | Stop reason: HOSPADM

## 2018-01-20 RX ADMIN — HYDROXYZINE PAMOATE 100 MG: 50 CAPSULE ORAL at 14:54

## 2018-01-20 RX ADMIN — SIMVASTATIN 20 MG: 20 TABLET, FILM COATED ORAL at 20:37

## 2018-01-20 RX ADMIN — FLUOXETINE 60 MG: 20 CAPSULE ORAL at 14:54

## 2018-01-20 RX ADMIN — DIVALPROEX SODIUM 500 MG: 500 TABLET, DELAYED RELEASE ORAL at 20:37

## 2018-01-20 RX ADMIN — OMEPRAZOLE 20 MG: 20 CAPSULE, DELAYED RELEASE ORAL at 12:59

## 2018-01-20 RX ADMIN — ASPIRIN 81 MG 81 MG: 81 TABLET ORAL at 12:59

## 2018-01-20 RX ADMIN — LEVOTHYROXINE SODIUM 137 MCG: 137 TABLET ORAL at 12:59

## 2018-01-20 RX ADMIN — HYDROXYZINE PAMOATE 100 MG: 50 CAPSULE ORAL at 20:37

## 2018-01-20 RX ADMIN — TRAZODONE HYDROCHLORIDE 100 MG: 100 TABLET ORAL at 20:37

## 2018-01-20 ASSESSMENT — ACTIVITIES OF DAILY LIVING (ADL)
TRANSFERRING: 0-->INDEPENDENT
GROOMING: INDEPENDENT
AMBULATION: 0-->INDEPENDENT
DRESS: 0-->INDEPENDENT
FALL_HISTORY_WITHIN_LAST_SIX_MONTHS: NO
RETIRED_EATING: 0-->INDEPENDENT
RETIRED_COMMUNICATION: 0-->UNDERSTANDS/COMMUNICATES WITHOUT DIFFICULTY
ORAL_HYGIENE: INDEPENDENT
LAUNDRY: UNABLE TO COMPLETE
DRESS: INDEPENDENT
LAUNDRY: UNABLE TO COMPLETE
DRESS: SCRUBS (BEHAVIORAL HEALTH)
ORAL_HYGIENE: INDEPENDENT
ORAL_HYGIENE: INDEPENDENT
COGNITION: 0 - NO COGNITION ISSUES REPORTED
BATHING: 0-->INDEPENDENT
TOILETING: 0-->INDEPENDENT
GROOMING: INDEPENDENT
GROOMING: INDEPENDENT
DRESS: SCRUBS (BEHAVIORAL HEALTH)
SWALLOWING: 0-->SWALLOWS FOODS/LIQUIDS WITHOUT DIFFICULTY

## 2018-01-20 NOTE — IP AVS SNAPSHOT
MRN:[id number]                      After Visit Summary   1/20/2018    Desean Orlando    MRN: [id number]           Thank you!     Thank you for choosing [facility] for your care. Our goal is always to provide you with excellent care. Hearing back from our patients is one way we can continue to improve our services. Please take a few minutes to complete the written survey that you may receive in the mail after you visit with us. Thank you!        Patient Information     Date Of Birth          1965        About your hospital stay     You were admitted on:  January 20, 2018 You last received care in the: [facility]    You were discharged on:  January 23, 2018       Who to Call     For medical emergencies, please call 911.  For non-urgent questions about your medical care, please call your primary care provider or clinic, [phone]          Attending Provider     Provider Specialty    Vlad Ruiz MD Psychiatry    Katelyn, ERIC Mathew CNP Nurse Practitioner       Primary Care Provider Office Phone # Fax #    [place] N Ulland [phone]      Further instructions from your care team       Behavioral Discharge Planning and Instructions     Summary: Desean was admitted to  for increased suicidal ideation       Main Diagnosis: Major depressive disorder, recurrent, moderate, Hx of TBI     Major Treatments, Procedures and Findings: Stabilize with medications, connect with community programs.     Symptoms to Report: feeling more aggressive, increased confusion, losing more sleep, mood getting worse or thoughts of suicide     Lifestyle Adjustment: Take all medications as prescribed, meet with doctor/ medication provider, out patient therapist, , and [facility] worker as scheduled. Abstain from alcohol or any unprescribed drugs.     Psychiatry Follow-up:      [facility]   PCP/Med. Management- Dr. Garcia - January 29th @ 8:40   [address] [place] [address] [place] [address]  Phone: [phone]  Fax: [phone]     [facility]   Therapy- [place] [address]  Phone:[phone]  Fax: [phone]     [facility]aquan Tinoco  Contact - Larisa [phone]   Fax: [phone]      [facility]   - [place] - cell phone: [phone]   [address] [place] [address]  Phone- [phone]   Fax- [phone]     [facility]   [place] - [phone]  Fax: [phone]     Resources:   Crisis Intervention: 303.483.6967 or 061-430-6539 (TTY: 273.985.9951).  Call anytime for help.  National Joiner on Mental Illness (www.[place].ko.org): 444.728.2634 or 995-332-2630.  Alcoholics Anonymous (www.alcoholics-anonymous.org): Check your phone book for your local chapter.  Suicide Awareness Voices of Education (SAVE) (www.save.org): 057-457-ZZEE (8682)  National Suicide Prevention Line (www.mentalhealthmn.org): 414-117-PDJU (2573)  Mental Health Consumer/Survivor Network of [place] (www.mhcsn.net): 685.804.2342 or 281-600-4710  Mental Health Association of [place] (www.mentalhealth.org): 873.772.5472 or 833-507-6907     General Medication Instructions:   See your medication sheet(s) for instructions.   Take all medicines as directed.  Make no changes unless your doctor suggests them.   Go to all your doctor visits.  Be sure to have all your required lab tests. This way, your medicines can be refilled on time.  Do not use any drugs not prescribed by your doctor.  Avoid alcohol.     [place] Area:  [facility], Crisis stabilization [facility]- [phone]  [facility] Crisis Line: 0-362-555-4402  Adv[facility]ates For Family Peace: 291-9239  Sexual Assault Program [facility]: [phone] or 1-188.484.7463  [place] [facility] Women[facility]: 9-544-223-3314 Ext: 279       Calls answered Mon-Fri-8:00 am--4:30 pm     [place]:  Adv[facility]ates for Family Peace: 8-871-795-8459  [facility] first call for help:  "1-263.155.8587  [facility] Crisis Center:  (984) 769-9797        [place] Area:  Warm Line: 1-202.608.1065       Calls answered Tuesday--Saturday 4:00 pm--10:00 pm  [facility] Crisis Line - [phone]  [facility] 449-974-6776     [place] Statewide:  [place] Crisis and Referral Services: 1-131.984.6153  National Suicide Prevention Lifeline: 7-581-957-TALK (5802)   - fvc6icfn- Text  Life  to [address]58  First Call for Help:   [facility] Helpline- 6-612-TCWL-HELP      Pending Results     No orders found from 2018 to 2018.            Statement of Approval     Ordered          18 0905  I have reviewed and agree with all the recommendations and orders detailed in this document.  EFFECTIVE NOW     Approved and electronically signed by:  Tatyana Husain NP             Admission Information     Date & Time Provider Department Dept. Phone    2018 Nadiya Zepeda APRN CNP [facility] [phone]      Your Vitals Were     Blood Pressure Pulse Temperature Respirations Pulse Oximetry       121/58 54 97.1  F (36.2  C) (Tympanic) 12 96%       [facility] Information     [employer] lets you send messages to your doctor, view your test results, renew your prescriptions, schedule appointments and more. To sign up, go to [email or web address] . Click on \"Log in\" on the left side of the screen, which will take you to the Welcome page. Then click on \"Sign up Now\" on the right side of the page.     You will be asked to enter the access code listed below, as well as some personal information. Please follow the directions to create your username and password.     Your access code is: [email or web address]  Expires: 2018 11:32 AM     Your access code will  in 90 days. If you need help or a new code, please call your [facility] clinic or [phone].        Care EveryWhere ID     This is your Care EveryWhere ID. This could be used by other organizations to access your [facility] medical " records  [id number]        Equal Access to Services     [facility] Hospital[facility]SE[facility]N : Hadii aad ku hadelijahezequiel Gonzales, wamarinoda luqadaha, qaybta kaalmakimberley bee, clementine jon. So [facility] [phone].    ATENCIÓN: Si habla español, tiene a rosario disposición servicios gratuitos de asistencia lingüística. Ryaname al [phone].    We comply with applicable federal civil rights laws and [place] laws. We do not discriminate on the basis of race, color, national origin, age, disability, sex, sexual orientation, or gender identity.               Review of your medicines      CONTINUE these medicines which may have CHANGED, or have new prescriptions. If we are uncertain of the size of tablets/capsules you have at home, strength may be listed as something that might have changed.        Dose / Directions    * divalproex 250 MG EC tablet   Commonly known as:  DEPAKOTE   This may have changed:  when to take this   Used for:  Moderate episode of recurrent major depressive disorder (H)        Dose:  250 mg   Take 1 tablet (250 mg) by mouth every morning   Quantity:  30 tablet   Refills:  0       * divalproex 500 MG EC tablet   Commonly known as:  DEPAKOTE   This may have changed:  You were already taking a medication with the same name, and this prescription was added. Make sure you understand how and when to take each.   Used for:  Moderate episode of recurrent major depressive disorder (H)        Dose:  500 mg   Take 1 tablet (500 mg) by mouth At Bedtime   Quantity:  30 tablet   Refills:  0       * Notice:  This list has 2 medication(s) that are the same as other medications prescribed for you. Read the directions carefully, and ask your doctor or other care provider to review them with you.      CONTINUE these medicines which have NOT CHANGED        Dose / Directions    albuterol 108 (90 BASE) MCG/ACT Inhaler   Commonly known as:  PROAIR HFA/PROVENTIL HFA/VENTOLIN HFA        Dose:  2 puff   Inhale 2 puffs into the  lungs every 6 hours as needed for shortness of breath / dyspnea or wheezing   Refills:  0       ASPIRIN PO        Dose:  81 mg   Take 81 mg by mouth daily   Refills:  0       cetirizine 10 MG tablet   Commonly known as:  zyrTEC        Dose:  10 mg   Take 10 mg by mouth daily as needed for allergies   Refills:  0       CLONAZEPAM PO        Dose:  0.5 mg   Take 0.5 mg by mouth 2 times daily as needed for anxiety   Refills:  0       FLUOXETINE HCL PO        Dose:  60 mg   Take 60 mg by mouth every morning Per [facility], order is for 20 mg capsule, take 3 capsules (60 mg) every morning   Refills:  0       glucose 4 G Chew chewable tablet        Dose:  1 tablet   Take 1 tablet by mouth 4 times daily as needed for low blood sugar Per previous admission PTA meds. Pt denies using this now   Refills:  0       HYDROXYZINE PAMOATE PO        Dose:  100 mg   Take 100 mg by mouth 3 times daily   Refills:  0       insulin detemir 100 UNIT/ML injection   Commonly known as:  LEVEMIR        Dose:  40 Units   Inject 40 Units Subcutaneous 2 times daily Per previous admit PTA's. Pt denies using insulin now   Refills:  0       LEVOTHYROXINE SODIUM PO        Dose:  137 mcg   Take 137 mcg by mouth daily   Refills:  0       NITROGLYCERIN SL        Dose:  0.4 mg   Place 0.4 mg under the tongue every 5 minutes as needed for chest pain   Refills:  0       OMEPRAZOLE PO        Dose:  20 mg   Take 20 mg by mouth daily Per [facility], order is for extended release omeprazole 20 mg daily   Refills:  0       PROPRANOLOL HCL PO        Dose:  10 mg   Take 10 mg by mouth 3 times daily as needed for high blood pressure   Refills:  0       SIMVASTATIN PO        Dose:  20 mg   Take 20 mg by mouth At Bedtime   Refills:  0       TRAZODONE HCL PO        Dose:  100 mg   Take 100 mg by mouth At Bedtime   Refills:  0            Where to get your medicines      These medications were sent to [address]  Pharmacy - [place], [place] - [address] [facility] [address]  [facility] [address]     Phone:  [phone]     divalproex 250 MG EC tablet    divalproex 500 MG EC tablet                Protect others around you: Learn how to safely use, store and throw away your medicines at www.disposemymeds.org.             Medication List: This is a list of all your medications and when to take them. Check marks below indicate your daily home schedule. Keep this list as a reference.      Medications           Morning Afternoon Evening Bedtime As Needed    albuterol 108 (90 BASE) MCG/ACT Inhaler   Commonly known as:  PROAIR HFA/PROVENTIL HFA/VENTOLIN HFA   Inhale 2 puffs into the lungs every 6 hours as needed for shortness of breath / dyspnea or wheezing                                ASPIRIN PO   Take 81 mg by mouth daily   Last time this was given:  81 mg on 1/23/2018  8:21 AM                                cetirizine 10 MG tablet   Commonly known as:  zyrTEC   Take 10 mg by mouth daily as needed for allergies                                CLONAZEPAM PO   Take 0.5 mg by mouth 2 times daily as needed for anxiety                                * divalproex 250 MG EC tablet   Commonly known as:  DEPAKOTE   Take 1 tablet (250 mg) by mouth every morning   Last time this was given:  250 mg on 1/23/2018  8:21 AM                                * divalproex 500 MG EC tablet   Commonly known as:  DEPAKOTE   Take 1 tablet (500 mg) by mouth At Bedtime   Last time this was given:  250 mg on 1/23/2018  8:21 AM                                FLUOXETINE HCL PO   Take 60 mg by mouth every morning Per [facility], order is for 20 mg capsule, take 3 capsules (60 mg) every morning   Last time this was given:  60 mg on 1/23/2018  8:21 AM                                glucose 4 G Chew chewable tablet   Take 1 tablet by mouth 4 times daily as needed for low blood sugar Per previous admission PTA  meds. Pt denies using this now                                HYDROXYZINE PAMOATE PO   Take 100 mg by mouth 3 times daily   Last time this was given:  100 mg on 1/23/2018  8:20 AM                                insulin detemir 100 UNIT/ML injection   Commonly known as:  LEVEMIR   Inject 40 Units Subcutaneous 2 times daily Per previous admit PTA's. Pt denies using insulin now                                LEVOTHYROXINE SODIUM PO   Take 137 mcg by mouth daily   Last time this was given:  137 mcg on 1/23/2018  8:21 AM                                NITROGLYCERIN SL   Place 0.4 mg under the tongue every 5 minutes as needed for chest pain                                OMEPRAZOLE PO   Take 20 mg by mouth daily Per [facility], order is for extended release omeprazole 20 mg daily   Last time this was given:  20 mg on 1/23/2018  8:21 AM                                PROPRANOLOL HCL PO   Take 10 mg by mouth 3 times daily as needed for high blood pressure                                SIMVASTATIN PO   Take 20 mg by mouth At Bedtime   Last time this was given:  20 mg on 1/22/2018  8:28 PM                                TRAZODONE HCL PO   Take 100 mg by mouth At Bedtime   Last time this was given:  100 mg on 1/22/2018  8:28 PM                                * Notice:  This list has 2 medication(s) that are the same as other medications prescribed for you. Read the directions carefully, and ask your doctor or other care provider to review them with you.

## 2018-01-20 NOTE — PROGRESS NOTES
01/20/18 0516   Patient Belongings   Did you bring any home meds/supplements to the hospital?  Yes   Disposition of meds  Sent to security/pharmacy per site process   Patient Belongings clothing   Disposition of Belongings Kept with patient  (glasses on face,pants,jacket,shoes,top,2 boxer sox,paper notebook,pens,bag,cap,2  cigs an 3 lighter .88cents earbuds,cigrollar tobacco bag,filters cig,razors toothbrush paste scope)   Belongings Search Yes   Clothing Search Yes   Second Staff trye   General Info Comment n/a    List items sent to safe:cell phone wallet de mastercard,master,d.l. Players card 2 health cardsAll other belongings put in assigned cubby in belongings room.     I have reviewed my belongings list on admission and verify that it is correct.     Patient signature_______________________________    Second staff witness (if patient unable to sign) ______________________________       I have received all my belongings at discharge.    Patient signature________________________________    Rebecca1/20/2018  5:20 AM

## 2018-01-20 NOTE — PLAN OF CARE
Problem: Patient Care Overview  Goal: Individualization & Mutuality  Pt will deny any SI update discharge.   Pt will report an improvement of depression by discharged based on the 0 to 10 scale. On admission depression was a   6 out of 10.  Pt ask for self care supplies from [facility] while on the unit.      Outcome: No Change  Pt has been in bed with eyes closed and regular respirations observed all night. Will continue to monitor.

## 2018-01-20 NOTE — IP AVS SNAPSHOT
[facility]    [address] [place] [address]    Phone:  [phone]    Fax:  [phone]                                       After Visit Summary   1/20/2018    Desean Orlando    MRN: [id number]           After Visit Summary Signature Page     I have received my discharge instructions, and my questions have been answered. I have discussed any challenges I see with this plan with the nurse or doctor.    ..........................................................................................................................................  Patient/Patient Representative Signature      ..........................................................................................................................................  Patient Representative Print Name and Relationship to Patient    ..................................................               ................................................  Date                                            Time    ..........................................................................................................................................  Reviewed by Signature/Title    ...................................................              ..............................................  Date                                                            Time

## 2018-01-20 NOTE — PLAN OF CARE
Problem: Patient Care Overview  Goal: Individualization & Mutuality  Pt will deny any SI update discharge.   Pt will report an improvement of depression by discharged based on the 0 to 10 scale. On admission depression was a   6 out of 10.  Pt ask for self care supplies from [facility] while on the unit.      Outcome: Therapy, progress toward functional goals is gradual  Patient denies SI, HI, morgan., pain, depression and anxiety. He contracts for safety. He has been asleep much of this shift. When this writer went in to assess him, he was pleasant and cooperative, but he kept falling asleep throughout his conversation with this writer. Will attempt to talk with him later. He stated that his pharmacy was [facility] in [address]. He states that he is diabetic but has not been taking any insulin or oral medications for this. Per PTA med list from previous admit he was taking levemir insulin and he had glucose tablets PRN for hypoglycemia. Provider notified of this.     Problem: Overarching Goals (Adult)  Goal: Optimized Coping Skills in Response to Life Stressors    Intervention: Promote Effective Coping Strategies  Patient falls asleep when talking with this writer and does not list coping strategies. Multiple attempts to gather information were made. Will retry later.    1300: Patient provided urine specimen, which was sent down to the lab per order. He did come out into the lounge area for lunch and he made a request for a catheter to self cath.

## 2018-01-20 NOTE — PLAN OF CARE
"Problem: Patient Care Overview  Goal: Individualization & Mutuality  Pt will deny any SI update discharge.   Pt will report an improvement of depression by discharged based on the 0 to 10 scale. On admission depression was a   6 out of 10.  Pt ask for self care supplies from [facility] while on the unit.      Outcome: No Change  ADMISSION NOTE    Reason for admission SI.  Safety concerns none at this time.  Risk for or history of violence none per report.   Full skin assessment: complete skin intact.    Patient arrived on unit from [facility] ED accompanied by transportation staff and house security on 1/20/2018  4:30 AM.   Status on arrival: pt steady on feet, closing eyes while waking, falling asleep during nursing assessment.   /77  Pulse 58  Temp 96.7  F (35.9  C) (Tympanic)  Resp 14  SpO2 97%  Patient given tour of unit and Welcome to  unit papers given to patient, wanding completed, belongings inventoried, and admission assessment completed.   Patient's legal status on arrival is  hold. Patient Bill of Rights discussed with and copy given to patient.   Patient denies SI, HI, and thoughts of self harm and contracts for safety while on unit.      Per nurse to nurse report pt came to [facility] ED with reports of SI with a plan to take all his pills. Pt was in there ED the day prior with the same reports but was sent home. Per nurse reporting when staff talked to pt about sending him home on his return to ED he stated that he would \"prove\" them wrong and suicide. Pt is homeless. No history of violence per report. Pt BA was negative. Urine was not completed at time of report and when asked if ED was going to complete this prior to sending pt they stated that they were not planning on getting a urine. At 2208 pt received Depakote 250 mg, simvastatin 20 mg, and trazodone 100 mg.  Pt also self caths with 14 Turkish.     Upon arrival to the unit pt was cooperative with changing into scrubs. When " writer was asking pt admission questions pt would fall asleep between questions and have to repeat the questions. Admission was unable to be completed at this time due to pt falling asleep. Will update next shift to be completed.     Mino Murillo  1/20/2018  4:30 AM

## 2018-01-20 NOTE — PLAN OF CARE
Face to face end of shift report received from Mino ADLER RN. Rounding completed. Patient observed.     Charlene Esparza  1/20/2018  7:53 AM

## 2018-01-20 NOTE — H&P
"Psychiatric Eval/H&P  Patient Name: Desean Orlando   YOB: 1965  Age: 52 year old  [id number]    Primary Physician: Ulland, Denver N   Completed By: Tatyana Husain NP     CC: \"I just didn't feel right\"    HPI   Desean Orlando is a 52 year old single  male who presented to the [facility] ED with suicidal ideation. He had been in the ED the previous day with similar complaint, though was discharged home. He reported thoughts of \"taking all of my pills\". He stated \"I'm tired of my life. I want to walk in front of a bus or truck to do enough damage. I want to kill myself and I don't want to be around anymore. I feel useless and I don't care\". He reports living at [facility] and reports that as a big stressor for him, as people are always trying to take his medications and Klonopin. He states that he did not know what to do, so came to the ED. He continued to report suicidal ideation. When the topic of discharge was brought up to him in the ED he stated \"discharge me and I will prove it to you\".    Today Desean reports that he was feeling suicidal yesterday, though denies this today. He states that he has been staying at [facility] for a few months and does not feel safe there. He states that people there have been threatening him and want to take his Klonopin and pain meds. He then does tell me that he has been working with a  at [facility] and is set to move into a board and lodge in [place] on Monday. He tells me that he should be ready for discharge on Monday. He does report feeling depressed recently, though believes that his medications have been working well. He does admit that he is likely forgetting to take some doses, reporting a poor memory related to history of TBI. He notes that he is looking forward to going to [place], as they will manage his medications and take him to his appointments.         [facility]     Past Psychiatric History:   Reports several mental health inpatient " "admission in the past. Last hospitalization was here at [facility] in October 2017. States that he had cognitive testing completed at [facility] in [place]. Denies any past suicide attempts. I believe that his PCP manages his medication and he is currently seeing a therapist at [facility]. Reports a  named Yaima. Past diagnoses include major depression, anxiety, PTSD, and bipolar disorder. Past medication trials include Prozac and Effexor as well as likely others that he is unable to remember.     Social History:   He reports that he was born in [place], [place] though grew up in [place]. He was raised by both parents until they  when he was 15 years old. Has 2 siblings, 1 sister and 1 brother. Only completed school through 10th grade. Is currently on disability. Had worked in the past as an autobody tech. He reports that he is not close with any of his family members. Was  for 14 years, got  in 2004. States that this ex-wife had an affair and ended up marrying one of his best friends. He has a 23 year old son. Denies any current legal issues. Currently living at [facility].      Chemical Use History:   He denies any recent use of drugs or alcohol. Does admit to a history of alcohol abuse in the past. Has been to CD treatment in the past.      Family Psychiatric History:   Reports that his mother struggled with depression and his father was a \"severe alcoholic\".        Medical History and ROS  No current outpatient prescriptions on file.     Allergies   Allergen Reactions     Bee Venom Anaphylaxis     Fish-Derived Products      Latex      Naproxen      Shellfish-Derived Products      Penicillins Rash     No past medical history on file.  No past surgical history on file.      Physical Exam    Constitutional: oriented to person, place, and time.    HENT:   Head: Normocephalic and atraumatic.   Right Ear: External ear normal.   Left Ear: External ear normal.   Nose: Nose " normal.   Mouth/Throat: Oropharynx is clear and moist.   Eyes: Conjunctivae and EOM are normal.   Neck: Normal range of motion. Neck supple.   Cardiovascular: Normal rate, regular rhythm  Pulmonary/Chest: Effort normal and breath sounds normal.   Abdominal: Soft. Bowel sounds are normal.    Skin: Visible skin appears intact    Review of Systems:  Constitution: No weight loss, fever, night sweats. Hx of memory issues  Skin: No rashes, pruritus or open wounds  Neuro: No headaches or seizure activity, reports history of TBI  Psych:  See HPI  Eyes: No vision changes.  ENT: No problems chewing or swallowing.   Musculoskeletal: No muscle pain, joint pain or swelling   Respiratory: No cough or dyspnea  Cardiovascular:  No chest pain,  palpitations or fainting  Gastrointestinal:  No abdominal pain, nausea, vomiting or change in bowel habits         MSE/PSYCH  PSYCHIATRIC EXAM  /70  Pulse 65  Temp 97.6  F (36.4  C) (Tympanic)  Resp 18  SpO2 97%  -Appearance/Behavior:  No apparent distress, Casually groomed and Appears stated age    -Attitude:pleasant and cooperative  -Motor: normal or unremarkable.  -Gait: Normal.    -Abnormal involuntary movements: none noted.  -Mood: depressed and anxious.  -Affect: Appropriate/mood-congruent.  -Speech: Normal.                 -Thought process/associations: Logical, Linear and Goal directed.  -Thought content: no delusional thought content, denies paranoia  -Perceptual disturbances: No hallucinations..              -Suicidal/Homicidal Ideation: denies any continued suicidal thoughts today, no HI  -Judgment: Fair.  -Insight: Fair.  *Orientation: time, place and person.  *Memory: impaired, reports problems with memory since TBI  *Attention: Adequate for interview  *Language: fluent, no aphasias, able to repeat phrases and name objects. Vocab intact.  *Fund of information: appropriate for education  *Cognitive functioning estimate: 1 - slightly impaired.     Labs:   Preadmission  labs reviewed and in paper chart. BMP and CBC are WNL. ETOH negative. VPA level 15.     Assessment/Impression: This is a 52 year old yo male with a history of depression and TBI. He brought himself to the ED reporting suicidal ideation with plan to overdose or jump into traffic. He has reportedly been staying at [facility] and states that he is being threatened there because he will not give people his Klonopin. He does tell me that he is currently set to move into a board and lodge in [place] on Monday. He tells me that he should be ready for discharge on Monday. He denies suicidal ideation today. I suspect he may be looking for an alternative place to stay until he is able to move in to his new place on Monday. He does state that he would like to increase his Depakote while he is here, as his level was low in the ED. Will increase his bedtime dose for tonight. Will likely discharge Monday when transportation can be arranged.      Educated regarding medication indications, risks, benefits, side effects, contraindications and possible interactions. Verbally expressed understanding.     DX:  Major depressive disorder, recurrent, moderate  Hx of TBI     Plan:  Admit to Unit: [address] [place] [address]  Attending: Tatyana Husain CNP  Patient is: Voluntary  Preadmission labs reviewed.  Monitor for target symptoms: improved mood, decrease in SI  Provide a safe environment and therapeutic milieu.   Continue PTA meds- Hydroxyzine, Prozac, Trazodone, Propranolol  Increase Depakote EC to 250mg in AM and 500mg at bedtime  Labs: TSH, LFT, Vit D, Hgb A1c, UDS, UA  May self-cath as needed    Anticipated length of stay: 3 days       ERIC Mcgregor, CNP

## 2018-01-21 LAB
ALBUMIN SERPL-MCNC: 3.4 G/DL (ref 3.4–5)
ALP SERPL-CCNC: 89 U/L (ref 40–150)
ALT SERPL W P-5'-P-CCNC: 27 U/L (ref 0–70)
AST SERPL W P-5'-P-CCNC: 17 U/L (ref 0–45)
BILIRUB DIRECT SERPL-MCNC: <0.1 MG/DL (ref 0–0.2)
BILIRUB SERPL-MCNC: 0.1 MG/DL (ref 0.2–1.3)
PROT SERPL-MCNC: 7.3 G/DL (ref 6.8–8.8)
TSH SERPL DL<=0.005 MIU/L-ACNC: 5.99 MU/L (ref 0.4–4)

## 2018-01-21 PROCEDURE — 80076 HEPATIC FUNCTION PANEL: CPT | Performed by: NURSE PRACTITIONER

## 2018-01-21 PROCEDURE — 25000132 ZZH RX MED GY IP 250 OP 250 PS 637: Performed by: NURSE PRACTITIONER

## 2018-01-21 PROCEDURE — 36415 COLL VENOUS BLD VENIPUNCTURE: CPT | Performed by: NURSE PRACTITIONER

## 2018-01-21 PROCEDURE — 82306 VITAMIN D 25 HYDROXY: CPT | Performed by: NURSE PRACTITIONER

## 2018-01-21 PROCEDURE — 12400000 ZZH R&B MH

## 2018-01-21 PROCEDURE — 99232 SBSQ HOSP IP/OBS MODERATE 35: CPT | Performed by: NURSE PRACTITIONER

## 2018-01-21 PROCEDURE — 84443 ASSAY THYROID STIM HORMONE: CPT | Performed by: NURSE PRACTITIONER

## 2018-01-21 RX ORDER — DIVALPROEX SODIUM 500 MG/1
500 TABLET, DELAYED RELEASE ORAL AT BEDTIME
Qty: 30 TABLET | Refills: 0 | Status: SHIPPED | OUTPATIENT
Start: 2018-01-21

## 2018-01-21 RX ORDER — DIVALPROEX SODIUM 250 MG/1
250 TABLET, DELAYED RELEASE ORAL EVERY MORNING
Qty: 30 TABLET | Refills: 0 | Status: SHIPPED | OUTPATIENT
Start: 2018-01-21

## 2018-01-21 RX ADMIN — HYDROXYZINE PAMOATE 100 MG: 50 CAPSULE ORAL at 13:43

## 2018-01-21 RX ADMIN — SIMVASTATIN 20 MG: 20 TABLET, FILM COATED ORAL at 20:33

## 2018-01-21 RX ADMIN — OMEPRAZOLE 20 MG: 20 CAPSULE, DELAYED RELEASE ORAL at 09:35

## 2018-01-21 RX ADMIN — LEVOTHYROXINE SODIUM 137 MCG: 137 TABLET ORAL at 09:34

## 2018-01-21 RX ADMIN — DIVALPROEX SODIUM 500 MG: 500 TABLET, DELAYED RELEASE ORAL at 20:33

## 2018-01-21 RX ADMIN — TRAZODONE HYDROCHLORIDE 100 MG: 100 TABLET ORAL at 20:33

## 2018-01-21 RX ADMIN — FLUOXETINE 60 MG: 20 CAPSULE ORAL at 09:34

## 2018-01-21 RX ADMIN — HYDROXYZINE PAMOATE 100 MG: 50 CAPSULE ORAL at 09:35

## 2018-01-21 RX ADMIN — DIVALPROEX SODIUM 250 MG: 250 TABLET, DELAYED RELEASE ORAL at 09:35

## 2018-01-21 RX ADMIN — ASPIRIN 81 MG 81 MG: 81 TABLET ORAL at 09:35

## 2018-01-21 RX ADMIN — HYDROXYZINE PAMOATE 100 MG: 50 CAPSULE ORAL at 20:32

## 2018-01-21 ASSESSMENT — ACTIVITIES OF DAILY LIVING (ADL)
GROOMING: INDEPENDENT
ORAL_HYGIENE: INDEPENDENT
LAUNDRY: UNABLE TO COMPLETE
DRESS: SCRUBS (BEHAVIORAL HEALTH)

## 2018-01-21 NOTE — PROGRESS NOTES
"[facility]  Psychiatric Progress Note      Impression:   This is a 52 year old yo male with a history of depression and TBI.    Desean is up in the lounge when I see him today. He tells me that he is feeling \"pretty good actually\". He denies any current suicidal thoughts. He inquires as to whether he will be able to discharge tomorrow, as he was expected to move into a board and lodge in [place] Monday. Tolerated the increased dose of Depakote last night, denies any side effects from medication. Behavior has been controlled and appropriate. Slept well last night. Has not attended any groups so far today.    Educated regarding medication indications, risks, benefits, side effects, contraindications and possible interactions. Verbally expressed understanding.        DIagnoses:   Major depressive disorder, recurrent, moderate  Hx of TBI      Attestation:  Patient has been seen and evaluated by me,  Tatyana Husain NP          Interim History:   The patient's care was discussed with the treatment team and chart notes were reviewed.          Medications:     Current Facility-Administered Medications Ordered in Epic   Medication Dose Route Frequency Last Rate Last Dose     acetaminophen (TYLENOL) tablet 650 mg  650 mg Oral Q4H PRN         alum & mag hydroxide-simethicone (MYLANTA ES/MAALOX  ES) suspension 30 mL  30 mL Oral Q4H PRN         magnesium hydroxide (MILK OF MAGNESIA) suspension 30 mL  30 mL Oral At Bedtime PRN         OLANZapine (zyPREXA) tablet 10 mg  10 mg Oral Q2H PRN        Or     OLANZapine (zyPREXA) injection 10 mg  10 mg Intramuscular Q2H PRN         nicotine polacrilex (NICORETTE) gum 2-4 mg  2-4 mg Buccal Q1H PRN         albuterol (PROAIR HFA/PROVENTIL HFA/VENTOLIN HFA) Inhaler 2 puff  2 puff Inhalation Q6H PRN         aspirin chewable tablet 81 mg  81 mg Oral Daily   81 mg at 01/21/18 0935     cetirizine (zyrTEC) tablet 10 mg  10 mg Oral Daily PRN         levothyroxine " (SYNTHROID/LEVOTHROID) tablet 137 mcg  137 mcg Oral Daily   137 mcg at 01/21/18 0934     nitroGLYcerin (NITROSTAT) sublingual tablet 0.4 mg  0.4 mg Sublingual Q5 Min PRN         omeprazole (priLOSEC) CR capsule 20 mg  20 mg Oral Daily   20 mg at 01/21/18 0935     simvastatin (ZOCOR) tablet 20 mg  20 mg Oral At Bedtime   20 mg at 01/20/18 2037     traZODone (DESYREL) tablet 100 mg  100 mg Oral At Bedtime   100 mg at 01/20/18 2037     divalproex (DEPAKOTE) EC tablet 250 mg  250 mg Oral QAM   250 mg at 01/21/18 0935     divalproex (DEPAKOTE) EC tablet 500 mg  500 mg Oral At Bedtime   500 mg at 01/20/18 2037     FLUoxetine (PROzac) capsule 60 mg  60 mg Oral QAM   60 mg at 01/21/18 0934     glucose chewable tablet 1 tablet  1 tablet Oral 4x Daily PRN         hydrOXYzine (VISTARIL) capsule 100 mg  100 mg Oral TID   100 mg at 01/21/18 1343     propranolol (INDERAL) tablet 10 mg  10 mg Oral TID PRN         No current Epic-ordered outpatient prescriptions on file.          10 point ROS reviewed- reports memory impairment from TBI       Allergies:     Allergies   Allergen Reactions     Bee Venom Anaphylaxis     Fish-Derived Products      Latex      Naproxen      Shellfish-Derived Products      Penicillins Rash            Psychiatric Examination:   /60  Pulse 54  Temp 97.3  F (36.3  C) (Tympanic)  Resp 16  SpO2 96%  Weight is 0 lbs 0 oz  There is no height or weight on file to calculate BMI.    Appearance:  awake, alert, adequately groomed, dressed in hospital scrubs and appeared as age stated  Attitude:  cooperative  Eye Contact:  good  Mood:  better  Affect:  appropriate and in normal range  Speech:  clear, coherent  Psychomotor Behavior:  no evidence of tardive dyskinesia, dystonia, or tics  Thought Process:  logical, linear and goal oriented  Associations:  no loose associations  Thought Content:  no evidence of suicidal ideation or homicidal ideation and no evidence of psychotic thought  Insight:   fair  Judgment:  fair  Oriented to:  time, person, and place  Attention Span and Concentration:  fair  Recent and Remote Memory:  limited  Fund of Knowledge: low-normal  Muscle Strength and Tone: normal  Gait and Station: Normal           Labs:     Results for orders placed or performed during the hospital encounter of 01/20/18 (from the past 24 hour(s))   TSH   Result Value Ref Range    TSH 5.99 (H) 0.40 - 4.00 mU/L   Hepatic panel   Result Value Ref Range    Bilirubin Direct <0.1 0.0 - 0.2 mg/dL    Bilirubin Total 0.1 (L) 0.2 - 1.3 mg/dL    Albumin 3.4 3.4 - 5.0 g/dL    Protein Total 7.3 6.8 - 8.8 g/dL    Alkaline Phosphatase 89 40 - 150 U/L    ALT 27 0 - 70 U/L    AST 17 0 - 45 U/L              Plan:   Continue current medications  Will likely discharge Monday if transportation can be arranged.

## 2018-01-21 NOTE — PLAN OF CARE
Face to face end of shift report received from Aranza SCHULTE RN. Rounding completed. Patient observed.     Charlene Esparza  1/21/2018  12:44 AM

## 2018-01-21 NOTE — PLAN OF CARE
Problem: Patient Care Overview  Goal: Individualization & Mutuality  Pt will deny any SI update discharge.   Pt will report an improvement of depression by discharged based on the 0 to 10 scale. On admission depression was a   6 out of 10.  Pt ask for self care supplies from [facility]ff while on the unit.      Patient isolative to room most of the shift. Patient was calm and cooperative. Patient denies anxiety, depression, SI, hallucinations, and pain. Patient out in the lounge for meals. Patient did not attend any groups this shift.

## 2018-01-21 NOTE — PLAN OF CARE
Problem: Patient Care Overview  Goal: Individualization & Mutuality  Pt will deny any SI update discharge.   Pt will report an improvement of depression by discharged based on the 0 to 10 scale. On admission depression was a   6 out of 10.  Pt ask for self care supplies from [facility]ff while on the unit.      Outcome: No Change  Patient appears to be asleep. Regular respirations and position changes observed. Will continue to monitor.    0645: Patient continued to sleep through the night. No problems identified.

## 2018-01-21 NOTE — PLAN OF CARE
Face to face end of shift report received from mann RENTERIA. Rounding completed. Patient [place] [facility]    Stacie Lopez  1/21/2018  8:50 AM

## 2018-01-21 NOTE — PLAN OF CARE
Problem: Patient Care Overview  Goal: Individualization & Mutuality  Pt will deny any SI update discharge.   Pt will report an improvement of depression by discharged based on the 0 to 10 scale. On admission depression was a   6 out of 10.  Pt ask for self care supplies from [facility] while on the unit.      Outcome: Therapy, progress towards functional goals is fair  Patient presents in a calm and cooperative demeanor on this shift. Patient denies all criteria, including pain. Patient was out on the unit for meals, however isolates to his room after. Patient did return to his bedroom early to go to bed. Patient took all medications as prescribed. Patient appropriate with staff and peers and answers assessment questions appropriately.

## 2018-01-21 NOTE — PLAN OF CARE
Face to face end of shift report received from Stacie CHOU RN. Rounding completed. Patient observed.     Aranza Tyler  1/21/2018  3:59 PM

## 2018-01-21 NOTE — PLAN OF CARE
Face to face end of shift report received from Aranza SCHULTE RN. Rounding completed. Patient observed.     Mino Murillo  1/21/2018  12:41 AM

## 2018-01-22 PROCEDURE — 25000132 ZZH RX MED GY IP 250 OP 250 PS 637: Performed by: NURSE PRACTITIONER

## 2018-01-22 PROCEDURE — 12400000 ZZH R&B MH

## 2018-01-22 PROCEDURE — 99232 SBSQ HOSP IP/OBS MODERATE 35: CPT | Performed by: NURSE PRACTITIONER

## 2018-01-22 RX ADMIN — ASPIRIN 81 MG 81 MG: 81 TABLET ORAL at 08:36

## 2018-01-22 RX ADMIN — DIVALPROEX SODIUM 500 MG: 500 TABLET, DELAYED RELEASE ORAL at 20:28

## 2018-01-22 RX ADMIN — OMEPRAZOLE 20 MG: 20 CAPSULE, DELAYED RELEASE ORAL at 08:36

## 2018-01-22 RX ADMIN — HYDROXYZINE PAMOATE 100 MG: 50 CAPSULE ORAL at 20:28

## 2018-01-22 RX ADMIN — FLUOXETINE 60 MG: 20 CAPSULE ORAL at 08:35

## 2018-01-22 RX ADMIN — LEVOTHYROXINE SODIUM 137 MCG: 137 TABLET ORAL at 08:36

## 2018-01-22 RX ADMIN — SIMVASTATIN 20 MG: 20 TABLET, FILM COATED ORAL at 20:28

## 2018-01-22 RX ADMIN — TRAZODONE HYDROCHLORIDE 100 MG: 100 TABLET ORAL at 20:28

## 2018-01-22 RX ADMIN — HYDROXYZINE PAMOATE 100 MG: 50 CAPSULE ORAL at 08:36

## 2018-01-22 RX ADMIN — DIVALPROEX SODIUM 250 MG: 250 TABLET, DELAYED RELEASE ORAL at 08:36

## 2018-01-22 RX ADMIN — HYDROXYZINE PAMOATE 100 MG: 50 CAPSULE ORAL at 14:11

## 2018-01-22 ASSESSMENT — ACTIVITIES OF DAILY LIVING (ADL)
LAUNDRY: UNABLE TO COMPLETE
ORAL_HYGIENE: INDEPENDENT
DRESS: INDEPENDENT;SCRUBS (BEHAVIORAL HEALTH)
GROOMING: INDEPENDENT

## 2018-01-22 NOTE — PLAN OF CARE
Problem: Patient Care Overview  Goal: Individualization & Mutuality  Pt will deny any SI update discharge.   Pt will report an improvement of depression by discharged based on the 0 to 10 scale. On admission depression was a   6 out of 10.  Pt ask for self care supplies from [facility]ff while on the unit.      Outcome: Therapy, progress towards functional goals is fair  Patient presents in a calm and cooperative demeanor on this shift. Patient denies all criteria, including pain. Patient has been isolative to his room for the entirety of this shift, only out for dinner. Patient took all medications as prescribed. Patient did ask for straight catheter supplies. Patient does exhibit some memory issues, as when asked his history for needing the straight catheter, he stated he wasn't sure but knew he had surgery at one point that did not work. Patient also stated that he was hospitalized recently for an infection prior to his kidney infection but he could not remember what it was. Patient pleasant and appropriate.

## 2018-01-22 NOTE — PROGRESS NOTES
"[facility]  Psychiatric Progress Note      Impression:   This is a 52 year old yo male with a history of depression and TBI.    Desean is up in the lounge when I see him today and stated he barely has any anxiety or depression and no S/I. He tells me that he is feeling \"pretty good actually\". He stated he is ready for discharge tomorrow, to move into a board and lodge in [place] Monday. Tolerated the increased dose of Depakote and denies any side effects from medication. Behavior has been controlled and appropriate. Slept well last night. Has attended most groups and have been helpful. Sleeping and eating \"OK\".      Educated regarding medication indications, risks, benefits, side effects, contraindications and possible interactions. Verbally expressed understanding.        DIagnoses:   Major depressive disorder, recurrent, moderate  Hx of TBI      Attestation:  Patient has been seen and evaluated by me,  Jemima Fleming NP          Interim History:   The patient's care was discussed with the treatment team and chart notes were reviewed.          Medications:     Current Facility-Administered Medications Ordered in Epic   Medication Dose Route Frequency Last Rate Last Dose     acetaminophen (TYLENOL) tablet 650 mg  650 mg Oral Q4H PRN         alum & mag hydroxide-simethicone (MYLANTA ES/MAALOX  ES) suspension 30 mL  30 mL Oral Q4H PRN         magnesium hydroxide (MILK OF MAGNESIA) suspension 30 mL  30 mL Oral At Bedtime PRN         OLANZapine (zyPREXA) tablet 10 mg  10 mg Oral Q2H PRN        Or     OLANZapine (zyPREXA) injection 10 mg  10 mg Intramuscular Q2H PRN         nicotine polacrilex (NICORETTE) gum 2-4 mg  2-4 mg Buccal Q1H PRN         albuterol (PROAIR HFA/PROVENTIL HFA/VENTOLIN HFA) Inhaler 2 puff  2 puff Inhalation Q6H PRN         aspirin chewable tablet 81 mg  81 mg Oral Daily   81 mg at 01/21/18 0935     cetirizine (zyrTEC) tablet 10 mg  10 mg Oral Daily PRN         levothyroxine " (SYNTHROID/LEVOTHROID) tablet 137 mcg  137 mcg Oral Daily   137 mcg at 01/21/18 0934     nitroGLYcerin (NITROSTAT) sublingual tablet 0.4 mg  0.4 mg Sublingual Q5 Min PRN         omeprazole (priLOSEC) CR capsule 20 mg  20 mg Oral Daily   20 mg at 01/21/18 0935     simvastatin (ZOCOR) tablet 20 mg  20 mg Oral At Bedtime   20 mg at 01/20/18 2037     traZODone (DESYREL) tablet 100 mg  100 mg Oral At Bedtime   100 mg at 01/20/18 2037     divalproex (DEPAKOTE) EC tablet 250 mg  250 mg Oral QAM   250 mg at 01/21/18 0935     divalproex (DEPAKOTE) EC tablet 500 mg  500 mg Oral At Bedtime   500 mg at 01/20/18 2037     FLUoxetine (PROzac) capsule 60 mg  60 mg Oral QAM   60 mg at 01/21/18 0934     glucose chewable tablet 1 tablet  1 tablet Oral 4x Daily PRN         hydrOXYzine (VISTARIL) capsule 100 mg  100 mg Oral TID   100 mg at 01/21/18 1343     propranolol (INDERAL) tablet 10 mg  10 mg Oral TID PRN         No current Epic-ordered outpatient prescriptions on file.          10 point ROS reviewed- reports memory impairment from TBI       Allergies:     Allergies   Allergen Reactions     Bee Venom Anaphylaxis     Fish-Derived Products      Latex      Naproxen      Shellfish-Derived Products      Penicillins Rash            Psychiatric Examination:   /60  Pulse 54  Temp 97.4  F (36.3  C) (Tympanic)  Resp 14  SpO2 97%  Weight is 0 lbs 0 oz  There is no height or weight on file to calculate BMI.    Appearance:  awake, alert, adequately groomed, dressed in hospital scrubs and appeared as age stated  Attitude:  cooperative pleasant  Eye Contact:  good  Mood:  better  Affect:  appropriate and in normal range  Speech:  clear, coherent  Psychomotor Behavior:  no evidence of tardive dyskinesia, dystonia, or tics  Thought Process:  logical, linear and goal oriented  Associations:  no loose associations  Thought Content:  no evidence of suicidal ideation or homicidal ideation and no evidence of psychotic thought  Insight:   fair  Judgment:  fair  Oriented to:  time, person, and place  Attention Span and Concentration:  fair  Recent and Remote Memory:  limited  Fund of Knowledge: low-normal  Muscle Strength and Tone: normal  Gait and Station: Normal         Labs:     No results found for this or any previous visit (from the past 24 hour(s)).           Plan:   Continue current medications  Will likely discharge Monday if transportation can be arranged.

## 2018-01-22 NOTE — PLAN OF CARE
Face to face end of shift report received from pm RN. Rounding completed. Patient observed.     Sherif Dee  1/22/2018  12:22 AM

## 2018-01-22 NOTE — PLAN OF CARE
Face to face end of shift report received from Sherif COTTO RN. Rounding completed. Patient observed in [place].     Margy Lizama  1/22/2018  08:00 AM

## 2018-01-22 NOTE — PLAN OF CARE
"Problem: Patient Care Overview  Goal: Individualization & Mutuality  Pt will deny any SI update discharge.   Pt will report an improvement of depression by discharged based on the 0 to 10 scale. On admission depression was a   6 out of 10.  Pt ask for self care supplies from [facility] while on the unit.      Outcome: No Change  Pt. Denies HI, SI, anxiety, depression, pain and hallucinations. Pt. Reports \"I'm here because I have had 2 head injuries. TBI's and I am having a hard time with my mental health.\" Pt. cooperative with nursing assessment and medications. Pt. Encouraged to attend groups. \"I just want to rest now. I'm not refusing to attend group. I am just exhausted.\" Pt. In agreement to update staff to thoughts feelings of wanting to harm self or others. Pt. Eating WDL. Pt. Denies issues with bowel and bladder.   Goal: Team Discussion  Team Plan:   Outcome: No Change  BEHAVIORAL TEAM DISCUSSION    Participants:   Progress:   Continued Stay Criteria/Rationale:   Medical/Physical:   Precautions:   Behavioral Orders   Procedures     Code 1 - Restrict to Unit     Routine Programming     As clinically indicated     Status 15     Every 15 minutes.     Plan:   Rationale for change in precautions or plan:       "

## 2018-01-22 NOTE — PLAN OF CARE
Problem: Patient Care Overview  Goal: Discharge Needs Assessment  Outcome: No Change  Spoke with Kosta, pt  - [facility] stated they can transport pt tomorrow and Kosta will call to confirm and call back with a specific time.     Felipe will be here to pick pt up at 12:30 tomorrow to transport to [facility].
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
## 2018-01-23 VITALS
DIASTOLIC BLOOD PRESSURE: 58 MMHG | OXYGEN SATURATION: 96 % | RESPIRATION RATE: 12 BRPM | SYSTOLIC BLOOD PRESSURE: 121 MMHG | HEART RATE: 54 BPM | TEMPERATURE: 97.1 F

## 2018-01-23 LAB — DEPRECATED CALCIDIOL+CALCIFEROL SERPL-MC: 10 UG/L (ref 20–75)

## 2018-01-23 PROCEDURE — 25000132 ZZH RX MED GY IP 250 OP 250 PS 637: Performed by: NURSE PRACTITIONER

## 2018-01-23 PROCEDURE — 99239 HOSP IP/OBS DSCHRG MGMT >30: CPT | Performed by: NURSE PRACTITIONER

## 2018-01-23 RX ADMIN — DIVALPROEX SODIUM 250 MG: 250 TABLET, DELAYED RELEASE ORAL at 08:21

## 2018-01-23 RX ADMIN — ASPIRIN 81 MG 81 MG: 81 TABLET ORAL at 08:21

## 2018-01-23 RX ADMIN — LEVOTHYROXINE SODIUM 137 MCG: 137 TABLET ORAL at 08:21

## 2018-01-23 RX ADMIN — OMEPRAZOLE 20 MG: 20 CAPSULE, DELAYED RELEASE ORAL at 08:21

## 2018-01-23 RX ADMIN — FLUOXETINE 60 MG: 20 CAPSULE ORAL at 08:21

## 2018-01-23 RX ADMIN — HYDROXYZINE PAMOATE 100 MG: 50 CAPSULE ORAL at 08:20

## 2018-01-23 RX ADMIN — HYDROXYZINE PAMOATE 100 MG: 50 CAPSULE ORAL at 13:49

## 2018-01-23 ASSESSMENT — ACTIVITIES OF DAILY LIVING (ADL)
GROOMING: INDEPENDENT
ORAL_HYGIENE: INDEPENDENT
DRESS: INDEPENDENT
LAUNDRY: UNABLE TO COMPLETE

## 2018-01-23 NOTE — DISCHARGE INSTRUCTIONS
Behavioral Discharge Planning and Instructions     Summary: Desean was admitted to  for increased suicidal ideation       Main Diagnosis: Major depressive disorder, recurrent, moderate, Hx of TBI     Major Treatments, Procedures and Findings: Stabilize with medications, connect with community programs.     Symptoms to Report: feeling more aggressive, increased confusion, losing more sleep, mood getting worse or thoughts of suicide     Lifestyle Adjustment: Take all medications as prescribed, meet with doctor/ medication provider, out patient therapist, , and [facility] worker as scheduled. Abstain from alcohol or any unprescribed drugs.     Psychiatry Follow-up:      [facility]   PCP/Med. Management- Dr. Garcia - January 29th @ 8:40   [address] [place] [place] [address]  Phone: [phone]  Fax: [phone]     [facility]   Therapy- Flash Haley   [facility] [place] [address]  Phone:[phone]  Fax: [phone]     [facility]  Contact - Larisa [phone]   Fax: [phone]      [facility]   - [place]  cell phone: [phone]   [address] [address]  Phone- [phone]   Fax- [phone]     [facility]   [place] - [phone]  Fax: [phone]     Resources:   Crisis Intervention: 564.876.4234 or 378-722-3152 (TTY: 547.623.8558).  Call anytime for help.  National [place] on Mental Illness (www.[place].ko.org): 480.587.6497 or 019-547-5156.  Alcoholics Anonymous (www.alcoholics-anonymous.org): Check your phone book for your local chapter.  Suicide Awareness Voices of Education (SAVE) (www.save.org): 190-478-CMMV (2134)  National Suicide Prevention Line (www.mentalhealthmn.org): 543-576-YFXH (1476)  Mental Health Consumer/Survivor Network of [place] (www.mhcsn.net): 578.835.3215 or 778-786-4885  Mental Health Association of [place] (www.mentalhealth.org): 193.325.1375 or 769-886-0437     General Medication  Instructions:   See your medication sheet(s) for instructions.   Take all medicines as directed.  Make no changes unless your doctor suggests them.   Go to all your doctor visits.  Be sure to have all your required lab tests. This way, your medicines can be refilled on time.  Do not use any drugs not prescribed by your doctor.  Avoid alcohol.     [place] Area:  [facility], Crisis stabilization [facility]- [phone]  [facility] Crisis Line: 1-448.976.9490  [facility] Family [facility]: 294-8412  Sexual Assault Program [facility]: [phone] or 1-435.969.4413  [place] Battered Women's Program: 1-144.696.2424 Ext: 279       Calls answered Mon-Fri-8:00 am--4:30 pm     [place]:  [facility] Family Peace: 1-914.748.5387  [facility] first call for help: 6-090-442-8006  [facility] Crisis [facility]:  ([phone] 850-7641        [place] Area:  Warm Line: 1-891.680.1075       Calls answered Tuesday--Saturday 4:00 pm--10:00 pm  [facility] Crisis Line - [phone]  [place] [facility] 450-034-1500     [place] Statewide:  [place] Crisis and Referral Services: 1-292.565.7769  National Suicide Prevention Lifeline: 4-068-015-TALK (4926)   - fhp9sxsp- Text  Life  to 76[address]  First Call for Help: 2-1-1  [facility] Helpline- 4-652-QYIO-HELP

## 2018-01-23 NOTE — PLAN OF CARE
"Problem: Patient Care Overview  Goal: Individualization & Mutuality  Pt will deny any SI update discharge.   Pt will report an improvement of depression by discharged based on the 0 to 10 scale. On admission depression was a   6 out of 10.  Pt ask for self care supplies from saff while on the unit.      Outcome: No Change  Pt. Denies HI, SI, anxiety, depression, pain and hallucinations. Pt. cooperative with nursing assessment and medications. Pt. Encouraged to attend groups. Pt. Attending Pt. In agreement to update staff to thoughts feelings of wanting to harm self or others. Pt. Eating WDL. Pt. Reports he feels ready to discharge. \"I'm going to the place that I took a tour of and I will be with people I know.\"  Pt. Denies issues with bowel and bladder.     Pt. Transfer service went to [place] by mistake. Per [place] they will be to [facility] by 1500. Pt. Updated and in agreement.     Discharge Note    Patient Discharged to assisted living on 1/23/2018 3:25 PM via Health plan transportation accompanied by staff.     Patient informed of discharge instructions in AVS. patient verbalizes understanding and denies having any questions pertaining to AVS. Patient stable at time of discharge. Patient denies SI, HI, and thoughts of self harm at time of discharge. All personal belongings returned to patient. Discharge prescriptions sent to [facility] [address] [place] [address] via electronic communication. Psych evaluation, history and physical, AVS, and discharge summary faxed to next level of care- via JADA Lizama  1/23/2018  3:25 PM    "

## 2018-01-23 NOTE — PLAN OF CARE
Face to face end of shift report received from Margy MCCALLUM RN. Rounding completed. Patient observed in bed, appears asleep, respirations osberved.     Melia Morfin  1/23/2018  1:05 AM

## 2018-01-23 NOTE — PLAN OF CARE
Problem: Patient Care Overview  Goal: Individualization & Mutuality  Pt will deny any SI update discharge.   Pt will report an improvement of depression by discharged based on the 0 to 10 scale. On admission depression was a   6 out of 10.  Pt ask for self care supplies from saff while on the unit.      Outcome: No Change  Pt. Denies HI, SI, anxiety, depression, pain and hallucinations. Pt. cooperative with nursing assessment and medications. Pt. Encouraged to attend groups. Pt. Attending Pt. In agreement to update staff to thoughts feelings of wanting to harm self or others. Pt. Eating WDL. Pt. Denies issues with bowel and bladder.   Goal: Team Discussion  Team Plan:   Outcome: No Change  BEHAVIORAL TEAM DISCUSSION    Participants:   Progress:   Continued Stay Criteria/Rationale:   Medical/Physical:   Precautions:   Behavioral Orders   Procedures     Code 1 - Restrict to Unit     Routine Programming     As clinically indicated     Status 15     Every 15 minutes.     Plan:   Rationale for change in precautions or plan:

## 2018-01-23 NOTE — DISCHARGE SUMMARY
"Psychiatric Discharge Summary    Desean Orlando MRN# [id number]   Age: 52 year old YOB: 1965     Date of Admission:  1/20/2018  Date of Discharge:  1/23/2018  Admitting Physician:  Vlad Ruiz MD  Discharge Physician:  Tatyana Husain CNP         Event Leading to Hospitalization and Hospital Stay   Admission: Desean Orlando is a 52 year old single  male who presented to the [facility] ED with suicidal ideation. He had been in the ED the previous day with similar complaint, though was discharged home. He reported thoughts of \"taking all of my pills\". He stated \"I'm tired of my life. I want to walk in front of a bus or truck to do enough damage. I want to kill myself and I don't want to be around anymore. I feel useless and I don't care\". He reports living at [facility] and reports that as a big stressor for him, as people are always trying to take his medications and Klonopin. He states that he did not know what to do, so came to the ED. He continued to report suicidal ideation. When the topic of discharge was brought up to him in the ED he stated \"discharge me and I will prove it to you\".     Today Desean reports that he was feeling suicidal yesterday, though denies this today. He states that he has been staying at [facility] for a few months and does not feel safe there. He states that people there have been threatening him and want to take his Klonopin and pain meds. He then does tell me that he has been working with a  at [facility] and is set to move into a board and lodge in [place] on Monday. He tells me that he should be ready for discharge on Monday. He does report feeling depressed recently, though believes that his medications have been working well. He does admit that he is likely forgetting to take some doses, reporting a poor memory related to history of TBI. He notes that he is looking forward to going to [place], as they will manage his medications and take him to his " appointments.     Hospital course: Desean was admitted to the behavioral health unit as a voluntary patient. He reported that being at [facility] was exacerbating his mental health symptoms and reported that he would be able to move into a board and lodge in [place] on Monday. He was adamant that he was feeling suicidal prior to discharge, though denied any further suicidal ideation the entirety of his stay. Medications were continued. Depakote was increased while in the hospital and will likely need further increasing on an outpatient basis. He denied any further need for medication adjustments. He was visible in the milieu and attended groups. Behavior was appropriate and controlled. He will discharge to [facility] this afternoon and follow-up with outpatient providers.         At time of discharge, there is no evidence that patient is in immediate danger of self or others.        DIagnoses:   Major depressive disorder, recurrent, moderate  Hx of TBI           Labs:     Results for orders placed or performed during the hospital encounter of 01/20/18   Glucose by meter   Result Value Ref [place]    Glucose 218 (H) 70 - 99 mg/dL   Drug Screen Urine (Range)   Result Value Ref Range    Amphetamine Qual Urine Negative NEG^Negative    Barbiturates Qual Urine Negative NEG^Negative    Benzodiazepine Qual Urine Negative NEG^Negative    Cannabinoids Qual Urine Negative NEG^Negative    Cocaine Qual Urine Negative NEG^Negative    Opiates Qualitative Urine Negative NEG^Negative    Methadone Qual Urine Negative NEG^Negative    PCP Qual Urine Negative NEG^Negative   UA reflex to Microscopic   Result Value Ref [place]    Color Urine Yellow     Appearance Urine Slightly Cloudy     Glucose Urine 150 (A) NEG^Negative mg/dL    Bilirubin Urine Negative NEG^Negative    Ketones Urine 5 (A) NEG^Negative mg/dL    Specific Gravity Urine 1.015 1.003 - 1.035    Blood Urine Negative NEG^Negative    pH Urine 7.0 4.7 - 8.0 pH    Protein Albumin  Urine 10 (A) NEG^Negative mg/dL    Urobilinogen mg/dL Normal 0.0 - 2.0 mg/dL    Nitrite Urine Positive (A) NEG^Negative    Leukocyte Esterase Urine Small (A) NEG^Negative    Source Midstream Urine     RBC Urine 2 0 - 2 /HPF    WBC Urine 5 (H) 0 - 2 /HPF    Bacteria Urine None (A) NEG^Negative /HPF    Squamous Epithelial /HPF Urine <1 0 - 1 /HPF    Mucous Urine Present (A) NEG^Negative /LPF   TSH   Result Value Ref [place]    TSH 5.99 (H) 0.40 - 4.00 mU/L   Hepatic panel   Result Value Ref [place]    Bilirubin Direct <0.1 0.0 - 0.2 mg/dL    Bilirubin Total 0.1 (L) 0.2 - 1.3 mg/dL    Albumin 3.4 3.4 - 5.0 g/dL    Protein Total 7.3 6.8 - 8.8 g/dL    Alkaline Phosphatase 89 40 - 150 U/L    ALT 27 0 - 70 U/L    AST 17 0 - 45 U/L     Preadmission labs reviewed and in paper chart. BMP and CBC are WNL. ETOH negative. VPA level 15.         Discharge Medications:     Current Discharge Medication List      CONTINUE these medications which have CHANGED    Details   !! divalproex (DEPAKOTE) 250 MG EC tablet Take 1 tablet (250 mg) by mouth every morning  Qty: 30 tablet, Refills: 0    Associated Diagnoses: Moderate episode of recurrent major depressive disorder (H)      !! divalproex (DEPAKOTE) 500 MG EC tablet Take 1 tablet (500 mg) by mouth At Bedtime  Qty: 30 tablet, Refills: 0    Associated Diagnoses: Moderate episode of recurrent major depressive disorder (H)       !! - Potential duplicate medications found. Please discuss with provider.      CONTINUE these medications which have NOT CHANGED    Details   ASPIRIN PO Take 81 mg by mouth daily      cetirizine (ZYRTEC) 10 MG tablet Take 10 mg by mouth daily as needed for allergies      CLONAZEPAM PO Take 0.5 mg by mouth 2 times daily as needed for anxiety      FLUOXETINE HCL PO Take 60 mg by mouth every morning Per [facility], order is for 20 mg capsule, take 3 capsules (60 mg) every morning      HYDROXYZINE PAMOATE PO Take 100 mg by mouth 3 times daily       LEVOTHYROXINE SODIUM PO Take 137 mcg by mouth daily      OMEPRAZOLE PO Take 20 mg by mouth daily Per [facility], order is for extended release omeprazole 20 mg daily      PROPRANOLOL HCL PO Take 10 mg by mouth 3 times daily as needed for high blood pressure      SIMVASTATIN PO Take 20 mg by mouth At Bedtime      TRAZODONE HCL PO Take 100 mg by mouth At Bedtime      NITROGLYCERIN SL Place 0.4 mg under the tongue every 5 minutes as needed for chest pain      albuterol (PROAIR HFA/PROVENTIL HFA/VENTOLIN HFA) 108 (90 BASE) MCG/ACT Inhaler Inhale 2 puffs into the lungs every 6 hours as needed for shortness of breath / dyspnea or wheezing      insulin detemir (LEVEMIR) 100 UNIT/ML injection Inject 40 Units Subcutaneous 2 times daily Per previous admit PTA's. Pt denies using insulin now      glucose 4 G CHEW chewable tablet Take 1 tablet by mouth 4 times daily as needed for low blood sugar Per previous admission PTA meds. Pt denies using this now               Justification for dual anti-psychotic use: not applicable       Psychiatric Examination:   Appearance:  awake, alert, adequately groomed and appeared as age stated  Attitude:  cooperative  Eye Contact:  good  Mood:  better  Affect:  appropriate and in normal range  Speech:  clear, coherent  Psychomotor Behavior:  no evidence of tardive dyskinesia, dystonia, or tics  Thought Process:  logical, linear and goal oriented  Associations:  no loose associations  Thought Content:  no evidence of suicidal ideation or homicidal ideation and no evidence of psychotic thought  Insight:  fair  Judgment:  fair  Oriented to:  time, person, and place  Attention Span and Concentration:  fair  Recent and Remote Memory:  fair, reports memory issues r/t TBI  Fund of Knowledge: appropriate  Muscle Strength and Tone: normal  Gait and Station: Normal  Perception: no perceptual disturbances       Discharge Plan:   Psychiatry Follow-up:       [facility]    PCP/Med. Management- Dr. Garcia - January 29th @ 8:40   [address] [place] [address]  Phone: [phone]  Fax: [phone]      [facility]   [place] [address]  Phone:[phone]  Fax: [phone]      [facility]  Contact - Larisa [phone]   Fax: [phone]       [facility]   - [place] - cell phone: [phone] [address] [address]  Phone- [phone]   Fax- [phone]      [facility]   [place] - [phone]  Fax: [phone]       Attestation:  The patient has been seen and evaluated by me,  Tatyana Husain NP         Discharge Services Provided:    40 minutes spent on discharge services, including:  Final examination of patient.  Review and discussion of Hospital stay.  Instructions for continued outpatient care/goals.  Preparation of discharge records.  Preparation of medications refills and new prescriptions.

## 2018-01-23 NOTE — PLAN OF CARE
Face to face end of shift report received. Rounding completed. Patient observed.     Margy Lizama  1/22/2018  04:00 PM

## 2018-01-23 NOTE — PLAN OF CARE
Face to face end of shift report received from Melia PEARCE RN. Rounding completed. Patient observed in bed.     Margy Lizama  1/23/2018  7:39 AM

## 2018-01-23 NOTE — PLAN OF CARE
Problem: Patient Care Overview  Goal: Individualization & Mutuality  Pt will deny any SI update discharge.   Pt will report an improvement of depression by discharged based on the 0 to 10 scale. On admission depression was a   6 out of 10.  Pt ask for self care supplies from saff while on the unit.      Outcome: Improving  Pt has been in bed with eyes closed and regular respirations observed all night. Will continue to monitor.

## 2018-01-23 NOTE — PLAN OF CARE
Problem: Patient Care Overview  Goal: Discharge Needs Assessment  Outcome: Adequate for Discharge Date Met: 01/23/18  Discharge instructions, including; demographic sheet, psychiatric evaluation, discharge summary, and AVS were faxed to these next level of care providers Jaquan Tinoco and [facility]

## 2021-05-28 ENCOUNTER — RECORDS - HEALTHEAST (OUTPATIENT)
Dept: ADMINISTRATIVE | Facility: CLINIC | Age: 56
End: 2021-05-28

## 2024-04-01 NOTE — PLAN OF CARE
Problem: Patient Care Overview  Goal: Individualization & Mutuality  Pt will attend at least 50% of group.  Pt will be compliant with prescribed medication.  Pt will agree with treatment team recommendations.   Pt will sleep 6 - 8 hours a night   Patient out in the lounge at the start of the shift. Patient was cooperative with nurse assessment and medications. Patient denies depression, anxiety, SI and hallucinations. Patient reports that he feels good. Patient did attend groups this shift. Patient denies pain. Patient pleasant during conversation but had a flat affect. Patient answered questions appropriately. Will continue to monitor.       Yes